# Patient Record
Sex: FEMALE | Race: BLACK OR AFRICAN AMERICAN | NOT HISPANIC OR LATINO | Employment: STUDENT | URBAN - METROPOLITAN AREA
[De-identification: names, ages, dates, MRNs, and addresses within clinical notes are randomized per-mention and may not be internally consistent; named-entity substitution may affect disease eponyms.]

---
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
## 2021-01-07 ENCOUNTER — TELEPHONE (OUTPATIENT)
Dept: OBSTETRICS AND GYNECOLOGY | Facility: CLINIC | Age: 21
End: 2021-01-07

## 2021-01-20 ENCOUNTER — PROCEDURE VISIT (OUTPATIENT)
Dept: OBSTETRICS AND GYNECOLOGY | Facility: CLINIC | Age: 21
End: 2021-01-20
Payer: MEDICAID

## 2021-01-20 ENCOUNTER — INITIAL PRENATAL (OUTPATIENT)
Dept: OBSTETRICS AND GYNECOLOGY | Facility: CLINIC | Age: 21
End: 2021-01-20
Payer: MEDICAID

## 2021-01-20 ENCOUNTER — LAB VISIT (OUTPATIENT)
Dept: LAB | Facility: HOSPITAL | Age: 21
End: 2021-01-20
Attending: MIDWIFE
Payer: MEDICAID

## 2021-01-20 VITALS
SYSTOLIC BLOOD PRESSURE: 112 MMHG | WEIGHT: 113.13 LBS | BODY MASS INDEX: 20.03 KG/M2 | DIASTOLIC BLOOD PRESSURE: 70 MMHG

## 2021-01-20 DIAGNOSIS — O26.841 UTERINE SIZE DATE DISCREPANCY PREGNANCY, FIRST TRIMESTER: ICD-10-CM

## 2021-01-20 DIAGNOSIS — Z13.79 GENETIC TESTING: ICD-10-CM

## 2021-01-20 DIAGNOSIS — Z32.01 POSITIVE PREGNANCY TEST: ICD-10-CM

## 2021-01-20 DIAGNOSIS — Z36.89 ENCOUNTER FOR FETAL ANATOMIC SURVEY: ICD-10-CM

## 2021-01-20 DIAGNOSIS — Z34.01 SUPERVISION OF NORMAL FIRST PREGNANCY IN FIRST TRIMESTER: Primary | ICD-10-CM

## 2021-01-20 PROCEDURE — 99212 OFFICE O/P EST SF 10 MIN: CPT | Mod: PBBFAC,TH,25 | Performed by: MIDWIFE

## 2021-01-20 PROCEDURE — 36415 COLL VENOUS BLD VENIPUNCTURE: CPT

## 2021-01-20 PROCEDURE — 99999 PR PBB SHADOW E&M-EST. PATIENT-LVL II: CPT | Mod: PBBFAC,,, | Performed by: MIDWIFE

## 2021-01-20 PROCEDURE — 76816 OB US FOLLOW-UP PER FETUS: CPT | Mod: 26,S$PBB,, | Performed by: OBSTETRICS & GYNECOLOGY

## 2021-01-20 PROCEDURE — 76816 OB US FOLLOW-UP PER FETUS: CPT | Mod: PBBFAC | Performed by: OBSTETRICS & GYNECOLOGY

## 2021-01-20 PROCEDURE — 99213 OFFICE O/P EST LOW 20 MIN: CPT | Mod: TH,S$PBB,25, | Performed by: MIDWIFE

## 2021-01-20 PROCEDURE — 99999 PR PBB SHADOW E&M-EST. PATIENT-LVL II: ICD-10-PCS | Mod: PBBFAC,,, | Performed by: MIDWIFE

## 2021-01-20 PROCEDURE — 81511 FTL CGEN ABNOR FOUR ANAL: CPT

## 2021-01-20 PROCEDURE — 76816 PR  US,PREGNANT UTERUS,F/U,TRANSABD APP: ICD-10-PCS | Mod: 26,S$PBB,, | Performed by: OBSTETRICS & GYNECOLOGY

## 2021-01-20 PROCEDURE — 99213 PR OFFICE/OUTPT VISIT, EST, LEVL III, 20-29 MIN: ICD-10-PCS | Mod: TH,S$PBB,25, | Performed by: MIDWIFE

## 2021-01-20 RX ORDER — CLINDAMYCIN HYDROCHLORIDE 300 MG/1
CAPSULE ORAL
COMMUNITY
Start: 2021-01-04 | End: 2021-02-22

## 2021-01-20 RX ORDER — ACETAMINOPHEN AND CODEINE PHOSPHATE 300; 30 MG/1; MG/1
1 TABLET ORAL
COMMUNITY
Start: 2021-01-06 | End: 2021-02-22

## 2021-01-20 RX ORDER — AMOXICILLIN AND CLAVULANATE POTASSIUM 875; 125 MG/1; MG/1
TABLET, FILM COATED ORAL
COMMUNITY
Start: 2021-01-18 | End: 2021-05-07

## 2021-01-20 RX ORDER — ASCORBIC ACID, CHOLECALCIFEROL, DL-.ALPHA.-TOCOPHEROL ACETATE, THIAMINE HYDROCHLORIDE, RIBOFLAVIN, NIACINAMIDE, PYRIDOXINE HYDROCHLORIDE, FOLIC ACID, CYANOCOBALAMIN, CALCIUM CARBONATE, FERROUS FUMARATE, ZINC OXIDE, CUPRIC SULFATE 100; 400; 30; 1.6; 1.6; 20; 3.1; 1; 12; 200; 27; 10; 2 MG/1; [IU]/1; [IU]/1; MG/1; MG/1; MG/1; MG/1; MG/1; UG/1; MG/1; MG/1; MG/1; MG/1
1 TABLET, COATED ORAL DAILY
Qty: 30 TABLET | Refills: 10 | Status: SHIPPED | OUTPATIENT
Start: 2021-01-20 | End: 2021-03-14 | Stop reason: SDUPTHER

## 2021-01-23 LAB
# FETUSES US: NORMAL
2ND TRIMESTER 4 SCREEN PNL SERPL: NEGATIVE
2ND TRIMESTER 4 SCREEN SERPL-IMP: NORMAL
AFP MOM SERPL: 1.3
AFP SERPL-MCNC: 70.8 NG/ML
AGE AT DELIVERY: 19
B-HCG MOM SERPL: 2.93
B-HCG SERPL-ACNC: 113.1 IU/ML
FET TS 21 RISK FROM MAT AGE: NORMAL
GA (DAYS): 2 D
GA (WEEKS): 17 WK
GA METHOD: NORMAL
IDDM PATIENT QL: NORMAL
INHIBIN A MOM SERPL: 1.39
INHIBIN A SERPL-MCNC: 218.8 PG/ML
SMOKING STATUS FTND: NO
TS 18 RISK FETUS: NORMAL
TS 21 RISK FETUS: NORMAL
U ESTRIOL MOM SERPL: 1.44
U ESTRIOL SERPL-MCNC: 2.09 NG/ML

## 2021-02-05 ENCOUNTER — PATIENT MESSAGE (OUTPATIENT)
Dept: OBSTETRICS AND GYNECOLOGY | Facility: CLINIC | Age: 21
End: 2021-02-05

## 2021-02-22 ENCOUNTER — PROCEDURE VISIT (OUTPATIENT)
Dept: OBSTETRICS AND GYNECOLOGY | Facility: CLINIC | Age: 21
End: 2021-02-22
Payer: MEDICAID

## 2021-02-22 ENCOUNTER — ROUTINE PRENATAL (OUTPATIENT)
Dept: OBSTETRICS AND GYNECOLOGY | Facility: CLINIC | Age: 21
End: 2021-02-22
Payer: MEDICAID

## 2021-02-22 VITALS
SYSTOLIC BLOOD PRESSURE: 100 MMHG | WEIGHT: 119.25 LBS | BODY MASS INDEX: 21.13 KG/M2 | DIASTOLIC BLOOD PRESSURE: 70 MMHG

## 2021-02-22 DIAGNOSIS — O09.32 LATE PRENATAL CARE COMPLICATING PREGNANCY IN SECOND TRIMESTER: ICD-10-CM

## 2021-02-22 DIAGNOSIS — R82.71 GBS BACTERIURIA: ICD-10-CM

## 2021-02-22 DIAGNOSIS — Z34.02 SUPERVISION OF NORMAL FIRST PREGNANCY IN SECOND TRIMESTER: Primary | ICD-10-CM

## 2021-02-22 DIAGNOSIS — Z36.89 ENCOUNTER FOR FETAL ANATOMIC SURVEY: ICD-10-CM

## 2021-02-22 PROBLEM — Z34.01 SUPERVISION OF NORMAL FIRST PREGNANCY IN FIRST TRIMESTER: Status: ACTIVE | Noted: 2021-02-22

## 2021-02-22 PROCEDURE — 99999 PR PBB SHADOW E&M-EST. PATIENT-LVL II: CPT | Mod: PBBFAC,,, | Performed by: MIDWIFE

## 2021-02-22 PROCEDURE — 99213 OFFICE O/P EST LOW 20 MIN: CPT | Mod: TH,S$PBB,, | Performed by: MIDWIFE

## 2021-02-22 PROCEDURE — 87077 CULTURE AEROBIC IDENTIFY: CPT

## 2021-02-22 PROCEDURE — 99213 PR OFFICE/OUTPT VISIT, EST, LEVL III, 20-29 MIN: ICD-10-PCS | Mod: TH,S$PBB,, | Performed by: MIDWIFE

## 2021-02-22 PROCEDURE — 87088 URINE BACTERIA CULTURE: CPT

## 2021-02-22 PROCEDURE — 87086 URINE CULTURE/COLONY COUNT: CPT

## 2021-02-22 PROCEDURE — 99999 PR PBB SHADOW E&M-EST. PATIENT-LVL II: ICD-10-PCS | Mod: PBBFAC,,, | Performed by: MIDWIFE

## 2021-02-22 PROCEDURE — 76805 PR US, OB 14+WKS, TRANSABD, SINGLE GESTATION: ICD-10-PCS | Mod: 26,S$PBB,, | Performed by: OBSTETRICS & GYNECOLOGY

## 2021-02-22 PROCEDURE — 76805 OB US >/= 14 WKS SNGL FETUS: CPT | Mod: 26,S$PBB,, | Performed by: OBSTETRICS & GYNECOLOGY

## 2021-02-22 PROCEDURE — 99212 OFFICE O/P EST SF 10 MIN: CPT | Mod: PBBFAC,TH,25 | Performed by: MIDWIFE

## 2021-02-22 PROCEDURE — 76805 OB US >/= 14 WKS SNGL FETUS: CPT | Mod: PBBFAC | Performed by: OBSTETRICS & GYNECOLOGY

## 2021-02-22 PROCEDURE — 87186 SC STD MICRODIL/AGAR DIL: CPT

## 2021-02-25 LAB — BACTERIA UR CULT: ABNORMAL

## 2021-03-12 ENCOUNTER — PATIENT MESSAGE (OUTPATIENT)
Dept: OBSTETRICS AND GYNECOLOGY | Facility: CLINIC | Age: 21
End: 2021-03-12

## 2021-03-14 DIAGNOSIS — Z34.01 SUPERVISION OF NORMAL FIRST PREGNANCY IN FIRST TRIMESTER: ICD-10-CM

## 2021-03-14 RX ORDER — ASCORBIC ACID, CHOLECALCIFEROL, DL-.ALPHA.-TOCOPHEROL ACETATE, THIAMINE HYDROCHLORIDE, RIBOFLAVIN, NIACINAMIDE, PYRIDOXINE HYDROCHLORIDE, FOLIC ACID, CYANOCOBALAMIN, CALCIUM CARBONATE, FERROUS FUMARATE, ZINC OXIDE, CUPRIC SULFATE 100; 400; 30; 1.6; 1.6; 20; 3.1; 1; 12; 200; 27; 10; 2 MG/1; [IU]/1; [IU]/1; MG/1; MG/1; MG/1; MG/1; MG/1; UG/1; MG/1; MG/1; MG/1; MG/1
1 TABLET, COATED ORAL DAILY
Qty: 30 TABLET | Refills: 10 | Status: SHIPPED | OUTPATIENT
Start: 2021-03-14 | End: 2021-03-30 | Stop reason: SDUPTHER

## 2021-03-25 ENCOUNTER — PATIENT MESSAGE (OUTPATIENT)
Dept: OBSTETRICS AND GYNECOLOGY | Facility: CLINIC | Age: 21
End: 2021-03-25

## 2021-03-26 ENCOUNTER — PATIENT MESSAGE (OUTPATIENT)
Dept: OBSTETRICS AND GYNECOLOGY | Facility: CLINIC | Age: 21
End: 2021-03-26

## 2021-03-30 ENCOUNTER — PROCEDURE VISIT (OUTPATIENT)
Dept: OBSTETRICS AND GYNECOLOGY | Facility: CLINIC | Age: 21
End: 2021-03-30
Payer: MEDICAID

## 2021-03-30 ENCOUNTER — ROUTINE PRENATAL (OUTPATIENT)
Dept: OBSTETRICS AND GYNECOLOGY | Facility: CLINIC | Age: 21
End: 2021-03-30
Payer: MEDICAID

## 2021-03-30 VITALS
WEIGHT: 126.31 LBS | SYSTOLIC BLOOD PRESSURE: 102 MMHG | BODY MASS INDEX: 22.38 KG/M2 | DIASTOLIC BLOOD PRESSURE: 60 MMHG

## 2021-03-30 DIAGNOSIS — Z34.02 NORMAL FIRST PREGNANCY IN SECOND TRIMESTER: Primary | ICD-10-CM

## 2021-03-30 DIAGNOSIS — Z3A.27 27 WEEKS GESTATION OF PREGNANCY: ICD-10-CM

## 2021-03-30 PROCEDURE — 99212 OFFICE O/P EST SF 10 MIN: CPT | Mod: PBBFAC,TH,25 | Performed by: MIDWIFE

## 2021-03-30 PROCEDURE — 99999 PR PBB SHADOW E&M-EST. PATIENT-LVL II: ICD-10-PCS | Mod: PBBFAC,,, | Performed by: MIDWIFE

## 2021-03-30 PROCEDURE — 76816 OB US FOLLOW-UP PER FETUS: CPT | Mod: PBBFAC | Performed by: OBSTETRICS & GYNECOLOGY

## 2021-03-30 PROCEDURE — 76816 OB US FOLLOW-UP PER FETUS: CPT | Mod: 26,S$PBB,, | Performed by: OBSTETRICS & GYNECOLOGY

## 2021-03-30 PROCEDURE — 99213 PR OFFICE/OUTPT VISIT, EST, LEVL III, 20-29 MIN: ICD-10-PCS | Mod: TH,S$PBB,, | Performed by: MIDWIFE

## 2021-03-30 PROCEDURE — 99999 PR PBB SHADOW E&M-EST. PATIENT-LVL II: CPT | Mod: PBBFAC,,, | Performed by: MIDWIFE

## 2021-03-30 PROCEDURE — 99213 OFFICE O/P EST LOW 20 MIN: CPT | Mod: TH,S$PBB,, | Performed by: MIDWIFE

## 2021-03-30 PROCEDURE — 76816 PR  US,PREGNANT UTERUS,F/U,TRANSABD APP: ICD-10-PCS | Mod: 26,S$PBB,, | Performed by: OBSTETRICS & GYNECOLOGY

## 2021-03-30 RX ORDER — CYCLOBENZAPRINE HCL 10 MG
10 TABLET ORAL 3 TIMES DAILY PRN
Qty: 30 TABLET | Refills: 0 | Status: SHIPPED | OUTPATIENT
Start: 2021-03-30 | End: 2021-04-09

## 2021-03-30 RX ORDER — ASCORBIC ACID, CHOLECALCIFEROL, DL-.ALPHA.-TOCOPHEROL ACETATE, THIAMINE HYDROCHLORIDE, RIBOFLAVIN, NIACINAMIDE, PYRIDOXINE HYDROCHLORIDE, FOLIC ACID, CYANOCOBALAMIN, CALCIUM CARBONATE, FERROUS FUMARATE, ZINC OXIDE, CUPRIC SULFATE 100; 400; 30; 1.6; 1.6; 20; 3.1; 1; 12; 200; 27; 10; 2 MG/1; [IU]/1; [IU]/1; MG/1; MG/1; MG/1; MG/1; MG/1; UG/1; MG/1; MG/1; MG/1; MG/1
1 TABLET, COATED ORAL
COMMUNITY
Start: 2021-01-20 | End: 2022-08-12 | Stop reason: ALTCHOICE

## 2021-04-05 ENCOUNTER — DOCUMENTATION ONLY (OUTPATIENT)
Dept: REHABILITATION | Facility: HOSPITAL | Age: 21
End: 2021-04-05

## 2021-04-21 ENCOUNTER — LAB VISIT (OUTPATIENT)
Dept: LAB | Facility: HOSPITAL | Age: 21
End: 2021-04-21
Attending: MIDWIFE
Payer: MEDICAID

## 2021-04-21 ENCOUNTER — ROUTINE PRENATAL (OUTPATIENT)
Dept: OBSTETRICS AND GYNECOLOGY | Facility: CLINIC | Age: 21
End: 2021-04-21
Payer: MEDICAID

## 2021-04-21 ENCOUNTER — TELEPHONE (OUTPATIENT)
Dept: OBSTETRICS AND GYNECOLOGY | Facility: CLINIC | Age: 21
End: 2021-04-21

## 2021-04-21 ENCOUNTER — PROCEDURE VISIT (OUTPATIENT)
Dept: OBSTETRICS AND GYNECOLOGY | Facility: CLINIC | Age: 21
End: 2021-04-21
Payer: MEDICAID

## 2021-04-21 VITALS
WEIGHT: 127.63 LBS | BODY MASS INDEX: 22.61 KG/M2 | SYSTOLIC BLOOD PRESSURE: 106 MMHG | DIASTOLIC BLOOD PRESSURE: 62 MMHG

## 2021-04-21 DIAGNOSIS — Z3A.27 27 WEEKS GESTATION OF PREGNANCY: ICD-10-CM

## 2021-04-21 DIAGNOSIS — Z34.03 NORMAL FIRST PREGNANCY IN THIRD TRIMESTER: Primary | ICD-10-CM

## 2021-04-21 LAB
BASOPHILS # BLD AUTO: 0.03 K/UL (ref 0–0.2)
BASOPHILS NFR BLD: 0.3 % (ref 0–1.9)
DIFFERENTIAL METHOD: ABNORMAL
EOSINOPHIL # BLD AUTO: 0.1 K/UL (ref 0–0.5)
EOSINOPHIL NFR BLD: 0.6 % (ref 0–8)
ERYTHROCYTE [DISTWIDTH] IN BLOOD BY AUTOMATED COUNT: 12.5 % (ref 11.5–14.5)
GIANT PLATELETS BLD QL SMEAR: PRESENT
HCT VFR BLD AUTO: 32.7 % (ref 37–48.5)
HGB BLD-MCNC: 10.9 G/DL (ref 12–16)
IMM GRANULOCYTES # BLD AUTO: 0.14 K/UL (ref 0–0.04)
IMM GRANULOCYTES NFR BLD AUTO: 1.4 % (ref 0–0.5)
LYMPHOCYTES # BLD AUTO: 1.4 K/UL (ref 1–4.8)
LYMPHOCYTES NFR BLD: 13.9 % (ref 18–48)
MCH RBC QN AUTO: 32.4 PG (ref 27–31)
MCHC RBC AUTO-ENTMCNC: 33.3 G/DL (ref 32–36)
MCV RBC AUTO: 97 FL (ref 82–98)
MONOCYTES # BLD AUTO: 0.9 K/UL (ref 0.3–1)
MONOCYTES NFR BLD: 9.5 % (ref 4–15)
NEUTROPHILS # BLD AUTO: 7.2 K/UL (ref 1.8–7.7)
NEUTROPHILS NFR BLD: 74.3 % (ref 38–73)
NRBC BLD-RTO: 0 /100 WBC
PLATELET # BLD AUTO: 196 K/UL (ref 150–450)
PLATELET BLD QL SMEAR: ABNORMAL
PMV BLD AUTO: 11.3 FL (ref 9.2–12.9)
POLYCHROMASIA BLD QL SMEAR: ABNORMAL
RBC # BLD AUTO: 3.36 M/UL (ref 4–5.4)
WBC # BLD AUTO: 9.74 K/UL (ref 3.9–12.7)
WBC TOXIC VACUOLES BLD QL SMEAR: PRESENT

## 2021-04-21 PROCEDURE — 99213 PR OFFICE/OUTPT VISIT, EST, LEVL III, 20-29 MIN: ICD-10-PCS | Mod: TH,S$PBB,, | Performed by: MIDWIFE

## 2021-04-21 PROCEDURE — 76816 OB US FOLLOW-UP PER FETUS: CPT | Mod: 51,PBBFAC | Performed by: OBSTETRICS & GYNECOLOGY

## 2021-04-21 PROCEDURE — 76816 OB US FOLLOW-UP PER FETUS: CPT | Mod: 26,S$PBB,, | Performed by: OBSTETRICS & GYNECOLOGY

## 2021-04-21 PROCEDURE — 82950 GLUCOSE TEST: CPT | Performed by: MIDWIFE

## 2021-04-21 PROCEDURE — 76819 FETAL BIOPHYS PROFIL W/O NST: CPT | Mod: 26,S$PBB,, | Performed by: OBSTETRICS & GYNECOLOGY

## 2021-04-21 PROCEDURE — 85025 COMPLETE CBC W/AUTO DIFF WBC: CPT | Performed by: MIDWIFE

## 2021-04-21 PROCEDURE — 36415 COLL VENOUS BLD VENIPUNCTURE: CPT | Performed by: MIDWIFE

## 2021-04-21 PROCEDURE — 86703 HIV-1/HIV-2 1 RESULT ANTBDY: CPT | Performed by: MIDWIFE

## 2021-04-21 PROCEDURE — 76816 PR  US,PREGNANT UTERUS,F/U,TRANSABD APP: ICD-10-PCS | Mod: 26,S$PBB,, | Performed by: OBSTETRICS & GYNECOLOGY

## 2021-04-21 PROCEDURE — 76819 PR US, OB, FETAL BIOPHYSICAL, W/O NST: ICD-10-PCS | Mod: 26,S$PBB,, | Performed by: OBSTETRICS & GYNECOLOGY

## 2021-04-21 PROCEDURE — 99212 OFFICE O/P EST SF 10 MIN: CPT | Mod: PBBFAC,TH,25 | Performed by: MIDWIFE

## 2021-04-21 PROCEDURE — 76819 FETAL BIOPHYS PROFIL W/O NST: CPT | Mod: 51,PBBFAC | Performed by: OBSTETRICS & GYNECOLOGY

## 2021-04-21 PROCEDURE — 86592 SYPHILIS TEST NON-TREP QUAL: CPT | Performed by: MIDWIFE

## 2021-04-21 PROCEDURE — 99999 PR PBB SHADOW E&M-EST. PATIENT-LVL II: CPT | Mod: PBBFAC,,, | Performed by: MIDWIFE

## 2021-04-21 PROCEDURE — 99213 OFFICE O/P EST LOW 20 MIN: CPT | Mod: TH,S$PBB,, | Performed by: MIDWIFE

## 2021-04-21 PROCEDURE — 99999 PR PBB SHADOW E&M-EST. PATIENT-LVL II: ICD-10-PCS | Mod: PBBFAC,,, | Performed by: MIDWIFE

## 2021-04-22 LAB
GLUCOSE SERPL-MCNC: 100 MG/DL (ref 70–140)
HIV 1+2 AB+HIV1 P24 AG SERPL QL IA: NEGATIVE
RPR SER QL: NORMAL

## 2021-04-29 ENCOUNTER — PATIENT MESSAGE (OUTPATIENT)
Dept: RESEARCH | Facility: HOSPITAL | Age: 21
End: 2021-04-29

## 2021-05-03 ENCOUNTER — PATIENT MESSAGE (OUTPATIENT)
Dept: ADMINISTRATIVE | Facility: OTHER | Age: 21
End: 2021-05-03

## 2021-05-07 ENCOUNTER — ROUTINE PRENATAL (OUTPATIENT)
Dept: OBSTETRICS AND GYNECOLOGY | Facility: CLINIC | Age: 21
End: 2021-05-07
Payer: MEDICAID

## 2021-05-07 VITALS — BODY MASS INDEX: 23.71 KG/M2 | WEIGHT: 133.81 LBS | DIASTOLIC BLOOD PRESSURE: 60 MMHG | SYSTOLIC BLOOD PRESSURE: 96 MMHG

## 2021-05-07 DIAGNOSIS — Z34.93 NORMAL PREGNANCY IN THIRD TRIMESTER: ICD-10-CM

## 2021-05-07 DIAGNOSIS — Z36.89 DETERMINE FETAL PRESENTATION USING ULTRASOUND: Primary | ICD-10-CM

## 2021-05-07 PROCEDURE — 99999 PR PBB SHADOW E&M-EST. PATIENT-LVL II: ICD-10-PCS | Mod: PBBFAC,,, | Performed by: MIDWIFE

## 2021-05-07 PROCEDURE — 99999 PR PBB SHADOW E&M-EST. PATIENT-LVL II: CPT | Mod: PBBFAC,,, | Performed by: MIDWIFE

## 2021-05-07 PROCEDURE — 99213 PR OFFICE/OUTPT VISIT, EST, LEVL III, 20-29 MIN: ICD-10-PCS | Mod: TH,S$PBB,, | Performed by: MIDWIFE

## 2021-05-07 PROCEDURE — 99212 OFFICE O/P EST SF 10 MIN: CPT | Mod: PBBFAC,TH | Performed by: MIDWIFE

## 2021-05-07 PROCEDURE — 99213 OFFICE O/P EST LOW 20 MIN: CPT | Mod: TH,S$PBB,, | Performed by: MIDWIFE

## 2021-05-07 RX ORDER — CYCLOBENZAPRINE HCL 10 MG
10 TABLET ORAL 3 TIMES DAILY PRN
COMMUNITY
Start: 2021-04-15 | End: 2021-05-07

## 2021-05-24 ENCOUNTER — ROUTINE PRENATAL (OUTPATIENT)
Dept: OBSTETRICS AND GYNECOLOGY | Facility: CLINIC | Age: 21
End: 2021-05-24
Payer: MEDICAID

## 2021-05-24 VITALS
WEIGHT: 135.81 LBS | SYSTOLIC BLOOD PRESSURE: 110 MMHG | DIASTOLIC BLOOD PRESSURE: 68 MMHG | BODY MASS INDEX: 24.06 KG/M2

## 2021-05-24 DIAGNOSIS — Z23 NEED FOR TDAP VACCINATION: ICD-10-CM

## 2021-05-24 DIAGNOSIS — Z34.03 NORMAL FIRST PREGNANCY IN THIRD TRIMESTER: Primary | ICD-10-CM

## 2021-05-24 DIAGNOSIS — Z3A.35 35 WEEKS GESTATION OF PREGNANCY: ICD-10-CM

## 2021-05-24 PROCEDURE — 99212 OFFICE O/P EST SF 10 MIN: CPT | Mod: PBBFAC,TH,25 | Performed by: MIDWIFE

## 2021-05-24 PROCEDURE — 99999 PR PBB SHADOW E&M-EST. PATIENT-LVL II: ICD-10-PCS | Mod: PBBFAC,,, | Performed by: MIDWIFE

## 2021-05-24 PROCEDURE — 99999 PR PBB SHADOW E&M-EST. PATIENT-LVL II: CPT | Mod: PBBFAC,,, | Performed by: MIDWIFE

## 2021-05-24 PROCEDURE — 90471 IMMUNIZATION ADMIN: CPT | Mod: PBBFAC

## 2021-05-24 PROCEDURE — 99213 OFFICE O/P EST LOW 20 MIN: CPT | Mod: TH,S$PBB,, | Performed by: MIDWIFE

## 2021-05-24 PROCEDURE — 99213 PR OFFICE/OUTPT VISIT, EST, LEVL III, 20-29 MIN: ICD-10-PCS | Mod: TH,S$PBB,, | Performed by: MIDWIFE

## 2021-05-31 ENCOUNTER — LAB VISIT (OUTPATIENT)
Dept: LAB | Facility: HOSPITAL | Age: 21
End: 2021-05-31
Attending: MIDWIFE
Payer: MEDICAID

## 2021-05-31 ENCOUNTER — ROUTINE PRENATAL (OUTPATIENT)
Dept: OBSTETRICS AND GYNECOLOGY | Facility: CLINIC | Age: 21
End: 2021-05-31
Payer: MEDICAID

## 2021-05-31 VITALS
SYSTOLIC BLOOD PRESSURE: 114 MMHG | WEIGHT: 139.75 LBS | BODY MASS INDEX: 24.76 KG/M2 | DIASTOLIC BLOOD PRESSURE: 68 MMHG

## 2021-05-31 DIAGNOSIS — Z3A.36 36 WEEKS GESTATION OF PREGNANCY: ICD-10-CM

## 2021-05-31 DIAGNOSIS — L29.9 PRURITUS: ICD-10-CM

## 2021-05-31 DIAGNOSIS — Z34.03 NORMAL FIRST PREGNANCY IN THIRD TRIMESTER: Primary | ICD-10-CM

## 2021-05-31 PROCEDURE — 99213 PR OFFICE/OUTPT VISIT, EST, LEVL III, 20-29 MIN: ICD-10-PCS | Mod: TH,S$PBB,, | Performed by: MIDWIFE

## 2021-05-31 PROCEDURE — 99999 PR PBB SHADOW E&M-EST. PATIENT-LVL II: ICD-10-PCS | Mod: PBBFAC,,, | Performed by: MIDWIFE

## 2021-05-31 PROCEDURE — 99999 PR PBB SHADOW E&M-EST. PATIENT-LVL II: CPT | Mod: PBBFAC,,, | Performed by: MIDWIFE

## 2021-05-31 PROCEDURE — 99212 OFFICE O/P EST SF 10 MIN: CPT | Mod: PBBFAC,TH | Performed by: MIDWIFE

## 2021-05-31 PROCEDURE — 82239 BILE ACIDS TOTAL: CPT | Performed by: MIDWIFE

## 2021-05-31 PROCEDURE — 99213 OFFICE O/P EST LOW 20 MIN: CPT | Mod: TH,S$PBB,, | Performed by: MIDWIFE

## 2021-05-31 PROCEDURE — 36415 COLL VENOUS BLD VENIPUNCTURE: CPT | Performed by: MIDWIFE

## 2021-06-02 LAB — BILE AC SERPL-SCNC: 2 MCMOL/L

## 2021-06-07 ENCOUNTER — HOSPITAL ENCOUNTER (OUTPATIENT)
Facility: HOSPITAL | Age: 21
Discharge: HOME | End: 2021-06-07
Attending: OBSTETRICS & GYNECOLOGY | Admitting: OBSTETRICS & GYNECOLOGY
Payer: MEDICAID

## 2021-06-07 ENCOUNTER — NURSE TRIAGE (OUTPATIENT)
Dept: ADMINISTRATIVE | Facility: CLINIC | Age: 21
End: 2021-06-07

## 2021-06-07 VITALS
OXYGEN SATURATION: 100 % | SYSTOLIC BLOOD PRESSURE: 94 MMHG | RESPIRATION RATE: 20 BRPM | DIASTOLIC BLOOD PRESSURE: 64 MMHG | TEMPERATURE: 99 F | HEART RATE: 100 BPM

## 2021-06-07 DIAGNOSIS — R55 VASOVAGAL SYNCOPE: ICD-10-CM

## 2021-06-07 PROCEDURE — 59025 OBTAIN FETAL NONSTRESS TEST (NST): ICD-10-PCS | Mod: 26,S$PBB,, | Performed by: MIDWIFE

## 2021-06-07 PROCEDURE — 59025 FETAL NON-STRESS TEST: CPT

## 2021-06-07 PROCEDURE — 59025 FETAL NON-STRESS TEST: CPT | Mod: 26,S$PBB,, | Performed by: MIDWIFE

## 2021-06-07 PROCEDURE — 99213 OFFICE O/P EST LOW 20 MIN: CPT | Mod: 25,TH,S$PBB, | Performed by: MIDWIFE

## 2021-06-07 PROCEDURE — 99213 PR OFFICE/OUTPT VISIT, EST, LEVL III, 20-29 MIN: ICD-10-PCS | Mod: 25,TH,S$PBB, | Performed by: MIDWIFE

## 2021-06-07 PROCEDURE — 99211 OFF/OP EST MAY X REQ PHY/QHP: CPT | Mod: 25

## 2021-06-07 RX ORDER — ACETAMINOPHEN 500 MG
500 TABLET ORAL EVERY 6 HOURS PRN
Status: DISCONTINUED | OUTPATIENT
Start: 2021-06-07 | End: 2021-06-07 | Stop reason: HOSPADM

## 2021-06-07 RX ORDER — SODIUM CHLORIDE, SODIUM LACTATE, POTASSIUM CHLORIDE, CALCIUM CHLORIDE 600; 310; 30; 20 MG/100ML; MG/100ML; MG/100ML; MG/100ML
INJECTION, SOLUTION INTRAVENOUS CONTINUOUS
Status: DISCONTINUED | OUTPATIENT
Start: 2021-06-07 | End: 2021-06-07 | Stop reason: HOSPADM

## 2021-06-07 RX ORDER — PROCHLORPERAZINE EDISYLATE 5 MG/ML
5 INJECTION INTRAMUSCULAR; INTRAVENOUS EVERY 6 HOURS PRN
Status: DISCONTINUED | OUTPATIENT
Start: 2021-06-07 | End: 2021-06-07 | Stop reason: HOSPADM

## 2021-06-07 RX ORDER — ONDANSETRON 8 MG/1
8 TABLET, ORALLY DISINTEGRATING ORAL EVERY 8 HOURS PRN
Status: DISCONTINUED | OUTPATIENT
Start: 2021-06-07 | End: 2021-06-07 | Stop reason: HOSPADM

## 2021-06-08 ENCOUNTER — PROCEDURE VISIT (OUTPATIENT)
Dept: OBSTETRICS AND GYNECOLOGY | Facility: CLINIC | Age: 21
End: 2021-06-08
Payer: MEDICAID

## 2021-06-08 ENCOUNTER — ROUTINE PRENATAL (OUTPATIENT)
Dept: OBSTETRICS AND GYNECOLOGY | Facility: CLINIC | Age: 21
End: 2021-06-08
Payer: MEDICAID

## 2021-06-08 VITALS
BODY MASS INDEX: 24.68 KG/M2 | WEIGHT: 139.31 LBS | SYSTOLIC BLOOD PRESSURE: 102 MMHG | DIASTOLIC BLOOD PRESSURE: 60 MMHG

## 2021-06-08 DIAGNOSIS — O09.33 LATE PRENATAL CARE AFFECTING PREGNANCY IN THIRD TRIMESTER: ICD-10-CM

## 2021-06-08 DIAGNOSIS — Z36.89 DETERMINE FETAL PRESENTATION USING ULTRASOUND: ICD-10-CM

## 2021-06-08 DIAGNOSIS — Z34.03 NORMAL FIRST PREGNANCY IN THIRD TRIMESTER: Primary | ICD-10-CM

## 2021-06-08 DIAGNOSIS — Z3A.37 37 WEEKS GESTATION OF PREGNANCY: ICD-10-CM

## 2021-06-08 DIAGNOSIS — O26.843 UTERINE SIZE-DATE DISCREPANCY IN THIRD TRIMESTER: ICD-10-CM

## 2021-06-08 PROCEDURE — 99212 OFFICE O/P EST SF 10 MIN: CPT | Mod: PBBFAC,TH | Performed by: MIDWIFE

## 2021-06-08 PROCEDURE — 99999 PR PBB SHADOW E&M-EST. PATIENT-LVL II: ICD-10-PCS | Mod: PBBFAC,,, | Performed by: MIDWIFE

## 2021-06-08 PROCEDURE — 99213 PR OFFICE/OUTPT VISIT, EST, LEVL III, 20-29 MIN: ICD-10-PCS | Mod: TH,S$PBB,, | Performed by: MIDWIFE

## 2021-06-08 PROCEDURE — 99213 OFFICE O/P EST LOW 20 MIN: CPT | Mod: TH,S$PBB,, | Performed by: MIDWIFE

## 2021-06-08 PROCEDURE — 76816 OB US FOLLOW-UP PER FETUS: CPT | Mod: PBBFAC | Performed by: OBSTETRICS & GYNECOLOGY

## 2021-06-08 PROCEDURE — 76818 FETAL BIOPHYS PROFILE W/NST: CPT | Mod: PBBFAC | Performed by: OBSTETRICS & GYNECOLOGY

## 2021-06-08 PROCEDURE — 76818 FETAL BIOPHYS PROFILE W/NST: CPT | Mod: 26,S$PBB,, | Performed by: OBSTETRICS & GYNECOLOGY

## 2021-06-08 PROCEDURE — 76816 PR  US,PREGNANT UTERUS,F/U,TRANSABD APP: ICD-10-PCS | Mod: 26,S$PBB,, | Performed by: OBSTETRICS & GYNECOLOGY

## 2021-06-08 PROCEDURE — 76818 PR US, OB, FETAL BIOPHYSICAL, W/NST: ICD-10-PCS | Mod: 26,S$PBB,, | Performed by: OBSTETRICS & GYNECOLOGY

## 2021-06-08 PROCEDURE — 76816 OB US FOLLOW-UP PER FETUS: CPT | Mod: 26,S$PBB,, | Performed by: OBSTETRICS & GYNECOLOGY

## 2021-06-08 PROCEDURE — 99999 PR PBB SHADOW E&M-EST. PATIENT-LVL II: CPT | Mod: PBBFAC,,, | Performed by: MIDWIFE

## 2021-06-09 ENCOUNTER — HOSPITAL ENCOUNTER (OUTPATIENT)
Facility: HOSPITAL | Age: 21
Discharge: HOME | End: 2021-06-09
Attending: OBSTETRICS & GYNECOLOGY | Admitting: OBSTETRICS & GYNECOLOGY
Payer: MEDICAID

## 2021-06-09 VITALS — RESPIRATION RATE: 18 BRPM | TEMPERATURE: 98 F

## 2021-06-09 DIAGNOSIS — O47.9 IRREGULAR CONTRACTIONS: ICD-10-CM

## 2021-06-09 PROCEDURE — 99213 OFFICE O/P EST LOW 20 MIN: CPT | Mod: TH,25,S$PBB, | Performed by: MIDWIFE

## 2021-06-09 PROCEDURE — 99211 OFF/OP EST MAY X REQ PHY/QHP: CPT | Mod: TH,25

## 2021-06-09 PROCEDURE — 59025 FETAL NON-STRESS TEST: CPT | Mod: 26,S$PBB,, | Performed by: MIDWIFE

## 2021-06-09 PROCEDURE — 59025 OBTAIN FETAL NONSTRESS TEST (NST): ICD-10-PCS | Mod: 26,S$PBB,, | Performed by: MIDWIFE

## 2021-06-09 PROCEDURE — 59025 FETAL NON-STRESS TEST: CPT

## 2021-06-09 PROCEDURE — 99213 PR OFFICE/OUTPT VISIT, EST, LEVL III, 20-29 MIN: ICD-10-PCS | Mod: TH,25,S$PBB, | Performed by: MIDWIFE

## 2021-06-18 ENCOUNTER — ROUTINE PRENATAL (OUTPATIENT)
Dept: OBSTETRICS AND GYNECOLOGY | Facility: CLINIC | Age: 21
End: 2021-06-18
Payer: MEDICAID

## 2021-06-18 VITALS — WEIGHT: 142.19 LBS | BODY MASS INDEX: 25.19 KG/M2 | SYSTOLIC BLOOD PRESSURE: 98 MMHG | DIASTOLIC BLOOD PRESSURE: 64 MMHG

## 2021-06-18 DIAGNOSIS — Z3A.38 38 WEEKS GESTATION OF PREGNANCY: ICD-10-CM

## 2021-06-18 DIAGNOSIS — Z34.03 NORMAL FIRST PREGNANCY IN THIRD TRIMESTER: Primary | ICD-10-CM

## 2021-06-18 PROBLEM — O47.9 IRREGULAR CONTRACTIONS: Status: RESOLVED | Noted: 2021-06-09 | Resolved: 2021-06-18

## 2021-06-18 PROCEDURE — 99213 PR OFFICE/OUTPT VISIT, EST, LEVL III, 20-29 MIN: ICD-10-PCS | Mod: TH,S$PBB,, | Performed by: MIDWIFE

## 2021-06-18 PROCEDURE — 99212 OFFICE O/P EST SF 10 MIN: CPT | Mod: PBBFAC,TH | Performed by: MIDWIFE

## 2021-06-18 PROCEDURE — 99213 OFFICE O/P EST LOW 20 MIN: CPT | Mod: TH,S$PBB,, | Performed by: MIDWIFE

## 2021-06-18 PROCEDURE — 99999 PR PBB SHADOW E&M-EST. PATIENT-LVL II: ICD-10-PCS | Mod: PBBFAC,,, | Performed by: MIDWIFE

## 2021-06-18 PROCEDURE — 99999 PR PBB SHADOW E&M-EST. PATIENT-LVL II: CPT | Mod: PBBFAC,,, | Performed by: MIDWIFE

## 2021-06-23 ENCOUNTER — ROUTINE PRENATAL (OUTPATIENT)
Dept: OBSTETRICS AND GYNECOLOGY | Facility: CLINIC | Age: 21
End: 2021-06-23
Payer: MEDICAID

## 2021-06-23 VITALS
DIASTOLIC BLOOD PRESSURE: 60 MMHG | WEIGHT: 140.88 LBS | SYSTOLIC BLOOD PRESSURE: 100 MMHG | BODY MASS INDEX: 24.95 KG/M2

## 2021-06-23 DIAGNOSIS — Z34.03 NORMAL FIRST PREGNANCY IN THIRD TRIMESTER: Primary | ICD-10-CM

## 2021-06-23 DIAGNOSIS — Z3A.39 39 WEEKS GESTATION OF PREGNANCY: ICD-10-CM

## 2021-06-23 PROCEDURE — 99213 PR OFFICE/OUTPT VISIT, EST, LEVL III, 20-29 MIN: ICD-10-PCS | Mod: TH,S$PBB,, | Performed by: MIDWIFE

## 2021-06-23 PROCEDURE — 99999 PR PBB SHADOW E&M-EST. PATIENT-LVL II: ICD-10-PCS | Mod: PBBFAC,,, | Performed by: MIDWIFE

## 2021-06-23 PROCEDURE — 99212 OFFICE O/P EST SF 10 MIN: CPT | Mod: PBBFAC,TH | Performed by: MIDWIFE

## 2021-06-23 PROCEDURE — 99213 OFFICE O/P EST LOW 20 MIN: CPT | Mod: TH,S$PBB,, | Performed by: MIDWIFE

## 2021-06-23 PROCEDURE — 99999 PR PBB SHADOW E&M-EST. PATIENT-LVL II: CPT | Mod: PBBFAC,,, | Performed by: MIDWIFE

## 2021-06-25 ENCOUNTER — TELEPHONE (OUTPATIENT)
Dept: OBSTETRICS AND GYNECOLOGY | Facility: HOSPITAL | Age: 21
End: 2021-06-25

## 2021-06-26 ENCOUNTER — HOSPITAL ENCOUNTER (INPATIENT)
Facility: HOSPITAL | Age: 21
LOS: 2 days | Discharge: HOME | End: 2021-06-28
Attending: OBSTETRICS & GYNECOLOGY | Admitting: OBSTETRICS & GYNECOLOGY
Payer: MEDICAID

## 2021-06-26 PROBLEM — O47.9 IRREGULAR UTERINE CONTRACTIONS: Status: ACTIVE | Noted: 2021-06-26

## 2021-06-26 PROBLEM — O47.9 IRREGULAR UTERINE CONTRACTIONS: Status: RESOLVED | Noted: 2021-06-26 | Resolved: 2021-06-26

## 2021-06-26 PROBLEM — O09.32 LATE PRENATAL CARE COMPLICATING PREGNANCY IN SECOND TRIMESTER: Status: RESOLVED | Noted: 2021-02-22 | Resolved: 2021-06-26

## 2021-06-26 PROBLEM — Z34.03 NORMAL FIRST PREGNANCY IN THIRD TRIMESTER: Status: RESOLVED | Noted: 2021-02-22 | Resolved: 2021-06-26

## 2021-06-26 PROCEDURE — 11000001 HC ACUTE MED/SURG PRIVATE ROOM

## 2021-06-26 PROCEDURE — 72200004 HC VAGINAL DELIVERY LEVEL I

## 2021-06-26 PROCEDURE — 25000003 PHARM REV CODE 250: Performed by: MIDWIFE

## 2021-06-26 RX ORDER — ONDANSETRON 8 MG/1
8 TABLET, ORALLY DISINTEGRATING ORAL EVERY 8 HOURS PRN
Status: DISCONTINUED | OUTPATIENT
Start: 2021-06-26 | End: 2021-06-28 | Stop reason: HOSPADM

## 2021-06-26 RX ORDER — PROCHLORPERAZINE EDISYLATE 5 MG/ML
5 INJECTION INTRAMUSCULAR; INTRAVENOUS EVERY 6 HOURS PRN
Status: DISCONTINUED | OUTPATIENT
Start: 2021-06-26 | End: 2021-06-28 | Stop reason: HOSPADM

## 2021-06-26 RX ORDER — DIPHENHYDRAMINE HYDROCHLORIDE 50 MG/ML
25 INJECTION INTRAMUSCULAR; INTRAVENOUS EVERY 4 HOURS PRN
Status: DISCONTINUED | OUTPATIENT
Start: 2021-06-26 | End: 2021-06-28 | Stop reason: HOSPADM

## 2021-06-26 RX ORDER — DOCUSATE SODIUM 100 MG/1
200 CAPSULE, LIQUID FILLED ORAL 2 TIMES DAILY PRN
Status: DISCONTINUED | OUTPATIENT
Start: 2021-06-26 | End: 2021-06-28 | Stop reason: HOSPADM

## 2021-06-26 RX ORDER — DIPHENHYDRAMINE HCL 25 MG
25 CAPSULE ORAL EVERY 4 HOURS PRN
Status: DISCONTINUED | OUTPATIENT
Start: 2021-06-26 | End: 2021-06-28 | Stop reason: HOSPADM

## 2021-06-26 RX ORDER — HYDROCORTISONE 25 MG/G
CREAM TOPICAL 3 TIMES DAILY PRN
Status: DISCONTINUED | OUTPATIENT
Start: 2021-06-26 | End: 2021-06-28 | Stop reason: HOSPADM

## 2021-06-26 RX ORDER — IBUPROFEN 600 MG/1
600 TABLET ORAL EVERY 6 HOURS
Status: DISCONTINUED | OUTPATIENT
Start: 2021-06-26 | End: 2021-06-28 | Stop reason: HOSPADM

## 2021-06-26 RX ORDER — SIMETHICONE 80 MG
1 TABLET,CHEWABLE ORAL EVERY 6 HOURS PRN
Status: DISCONTINUED | OUTPATIENT
Start: 2021-06-26 | End: 2021-06-28 | Stop reason: HOSPADM

## 2021-06-26 RX ORDER — OXYTOCIN/RINGER'S LACTATE 30/500 ML
95 PLASTIC BAG, INJECTION (ML) INTRAVENOUS ONCE
Status: DISCONTINUED | OUTPATIENT
Start: 2021-06-26 | End: 2021-06-28 | Stop reason: HOSPADM

## 2021-06-26 RX ORDER — ACETAMINOPHEN 325 MG/1
650 TABLET ORAL EVERY 6 HOURS PRN
Status: DISCONTINUED | OUTPATIENT
Start: 2021-06-26 | End: 2021-06-28 | Stop reason: HOSPADM

## 2021-06-26 RX ORDER — PRENATAL WITH FERROUS FUM AND FOLIC ACID 3080; 920; 120; 400; 22; 1.84; 3; 20; 10; 1; 12; 200; 27; 25; 2 [IU]/1; [IU]/1; MG/1; [IU]/1; MG/1; MG/1; MG/1; MG/1; MG/1; MG/1; UG/1; MG/1; MG/1; MG/1; MG/1
1 TABLET ORAL DAILY
Status: DISCONTINUED | OUTPATIENT
Start: 2021-06-26 | End: 2021-06-28 | Stop reason: HOSPADM

## 2021-06-26 RX ORDER — HYDROCODONE BITARTRATE AND ACETAMINOPHEN 5; 325 MG/1; MG/1
1 TABLET ORAL EVERY 4 HOURS PRN
Status: DISCONTINUED | OUTPATIENT
Start: 2021-06-26 | End: 2021-06-28 | Stop reason: HOSPADM

## 2021-06-26 RX ADMIN — PRENATAL VIT W/ FE FUMARATE-FA TAB 27-0.8 MG 1 TABLET: 27-0.8 TAB at 12:06

## 2021-06-26 RX ADMIN — IBUPROFEN 600 MG: 600 TABLET, FILM COATED ORAL at 12:06

## 2021-06-26 RX ADMIN — IBUPROFEN 600 MG: 600 TABLET, FILM COATED ORAL at 05:06

## 2021-06-27 PROCEDURE — 99231 SBSQ HOSP IP/OBS SF/LOW 25: CPT | Mod: 95,,, | Performed by: MIDWIFE

## 2021-06-27 PROCEDURE — 25000003 PHARM REV CODE 250: Performed by: MIDWIFE

## 2021-06-27 PROCEDURE — 99231 PR SUBSEQUENT HOSPITAL CARE,LEVL I: ICD-10-PCS | Mod: 95,,, | Performed by: MIDWIFE

## 2021-06-27 PROCEDURE — 11000001 HC ACUTE MED/SURG PRIVATE ROOM

## 2021-06-27 RX ADMIN — HYDROCODONE BITARTRATE AND ACETAMINOPHEN 1 TABLET: 5; 325 TABLET ORAL at 10:06

## 2021-06-27 RX ADMIN — IBUPROFEN 600 MG: 600 TABLET, FILM COATED ORAL at 12:06

## 2021-06-27 RX ADMIN — IBUPROFEN 600 MG: 600 TABLET, FILM COATED ORAL at 05:06

## 2021-06-27 RX ADMIN — IBUPROFEN 600 MG: 600 TABLET, FILM COATED ORAL at 11:06

## 2021-06-28 VITALS
TEMPERATURE: 98 F | RESPIRATION RATE: 18 BRPM | DIASTOLIC BLOOD PRESSURE: 53 MMHG | SYSTOLIC BLOOD PRESSURE: 90 MMHG | OXYGEN SATURATION: 98 % | HEART RATE: 65 BPM

## 2021-06-28 PROBLEM — R55 VASOVAGAL SYNCOPE: Status: RESOLVED | Noted: 2021-06-07 | Resolved: 2021-06-28

## 2021-06-28 PROBLEM — R82.71 GBS BACTERIURIA: Status: RESOLVED | Noted: 2020-12-30 | Resolved: 2021-06-28

## 2021-06-28 PROCEDURE — 25000003 PHARM REV CODE 250: Performed by: MIDWIFE

## 2021-06-28 PROCEDURE — 99238 HOSP IP/OBS DSCHRG MGMT 30/<: CPT | Mod: ,,, | Performed by: MIDWIFE

## 2021-06-28 PROCEDURE — 99238 PR HOSPITAL DISCHARGE DAY,<30 MIN: ICD-10-PCS | Mod: ,,, | Performed by: MIDWIFE

## 2021-06-28 RX ORDER — IBUPROFEN 600 MG/1
600 TABLET ORAL EVERY 6 HOURS PRN
Qty: 30 TABLET | Refills: 0 | Status: SHIPPED | OUTPATIENT
Start: 2021-06-28 | End: 2021-07-30

## 2021-06-28 RX ORDER — IBUPROFEN 600 MG/1
600 TABLET ORAL EVERY 6 HOURS PRN
Qty: 30 TABLET | Refills: 0 | Status: SHIPPED | OUTPATIENT
Start: 2021-06-28 | End: 2021-06-28

## 2021-06-28 RX ADMIN — IBUPROFEN 600 MG: 600 TABLET, FILM COATED ORAL at 05:06

## 2021-07-07 NOTE — PATIENT INSTRUCTIONS
Assessment:  Rick Sierra is a 19 y.o. female s/p left knee ACL reconstruction with quad tendon autograft and medial meniscus repair.       Encounter Diagnoses   Name Primary?    S/P ACL reconstruction Yes    S/P medial meniscus repair of left knee         Plan:   Continue physical therapy   Follow up in 2 weeks.       Follow-up: 2 weeks recheck or sooner if there are any problems between now and then.    Thank you for choosing [facility] and Dr. Jerson Torres for your orthopedic & sports medicine care. It is our goal to provide you with exceptional care that will help keep you healthy, active, and get you back in the game.    If you felt that you received exemplary care today, please consider leaving us feedback on [employer] at [email or web address]     Please do not hesitate to reach out to us via email, phone, or MyChart with any questions, concerns, or feedback.    If you are experiencing pain/discomfort ,or have questions after 5pm and would like to be connected to the [facility] [place] on-call team, please call this number and specify which Sports Medicine provider is treating you: [phone]     
See HPI
See HPI

## 2021-07-30 ENCOUNTER — POSTPARTUM VISIT (OUTPATIENT)
Dept: OBSTETRICS AND GYNECOLOGY | Facility: CLINIC | Age: 21
End: 2021-07-30
Payer: MEDICAID

## 2021-07-30 VITALS
DIASTOLIC BLOOD PRESSURE: 70 MMHG | BODY MASS INDEX: 22.07 KG/M2 | SYSTOLIC BLOOD PRESSURE: 94 MMHG | HEIGHT: 62 IN | WEIGHT: 119.94 LBS

## 2021-07-30 DIAGNOSIS — Z30.013 ENCOUNTER FOR INITIAL PRESCRIPTION OF INJECTABLE CONTRACEPTIVE: ICD-10-CM

## 2021-07-30 PROBLEM — O09.219 HX OF PRECIPITOUS LABOR AND DELIVERIES, ANTEPARTUM: Status: ACTIVE | Noted: 2021-06-26

## 2021-07-30 PROBLEM — M25.669 IMPAIRED RANGE OF MOTION OF KNEE: Status: RESOLVED | Noted: 2020-01-09 | Resolved: 2021-07-30

## 2021-07-30 PROBLEM — Z74.09 DECREASED STRENGTH, ENDURANCE, AND MOBILITY: Status: RESOLVED | Noted: 2020-01-09 | Resolved: 2021-07-30

## 2021-07-30 PROBLEM — R68.89 DECREASED STRENGTH, ENDURANCE, AND MOBILITY: Status: RESOLVED | Noted: 2020-01-09 | Resolved: 2021-07-30

## 2021-07-30 PROBLEM — R53.1 DECREASED STRENGTH, ENDURANCE, AND MOBILITY: Status: RESOLVED | Noted: 2020-01-09 | Resolved: 2021-07-30

## 2021-07-30 PROCEDURE — 99999 PR PBB SHADOW E&M-EST. PATIENT-LVL II: ICD-10-PCS | Mod: PBBFAC,,, | Performed by: ADVANCED PRACTICE MIDWIFE

## 2021-07-30 PROCEDURE — 99212 OFFICE O/P EST SF 10 MIN: CPT | Mod: PBBFAC,25 | Performed by: ADVANCED PRACTICE MIDWIFE

## 2021-07-30 PROCEDURE — 59430 PR CARE AFTER DELIVERY ONLY: ICD-10-PCS | Mod: TH,,, | Performed by: ADVANCED PRACTICE MIDWIFE

## 2021-07-30 PROCEDURE — 96372 THER/PROPH/DIAG INJ SC/IM: CPT | Mod: PBBFAC

## 2021-07-30 PROCEDURE — 99999 PR PBB SHADOW E&M-EST. PATIENT-LVL II: CPT | Mod: PBBFAC,,, | Performed by: ADVANCED PRACTICE MIDWIFE

## 2021-07-30 RX ORDER — MEDROXYPROGESTERONE ACETATE 150 MG/ML
150 INJECTION, SUSPENSION INTRAMUSCULAR
Status: ACTIVE | OUTPATIENT
Start: 2021-07-30 | End: 2022-07-25

## 2021-07-30 RX ADMIN — MEDROXYPROGESTERONE ACETATE 150 MG: 150 INJECTION, SUSPENSION, EXTENDED RELEASE INTRAMUSCULAR at 01:07

## 2021-08-06 ENCOUNTER — PATIENT MESSAGE (OUTPATIENT)
Dept: OBSTETRICS AND GYNECOLOGY | Facility: CLINIC | Age: 21
End: 2021-08-06

## 2021-08-24 ENCOUNTER — PATIENT MESSAGE (OUTPATIENT)
Dept: OBSTETRICS AND GYNECOLOGY | Facility: CLINIC | Age: 21
End: 2021-08-24

## 2021-10-15 ENCOUNTER — CLINICAL SUPPORT (OUTPATIENT)
Dept: OBSTETRICS AND GYNECOLOGY | Facility: CLINIC | Age: 21
End: 2021-10-15
Payer: MEDICAID

## 2021-10-15 PROCEDURE — 96372 THER/PROPH/DIAG INJ SC/IM: CPT | Mod: PBBFAC

## 2021-10-15 PROCEDURE — 99211 OFF/OP EST MAY X REQ PHY/QHP: CPT | Mod: PBBFAC

## 2021-10-15 PROCEDURE — 99999 PR PBB SHADOW E&M-EST. PATIENT-LVL I: ICD-10-PCS | Mod: PBBFAC,,,

## 2021-10-15 PROCEDURE — 99999 PR PBB SHADOW E&M-EST. PATIENT-LVL I: CPT | Mod: PBBFAC,,,

## 2021-10-15 RX ADMIN — MEDROXYPROGESTERONE ACETATE 150 MG: 150 INJECTION, SUSPENSION, EXTENDED RELEASE INTRAMUSCULAR at 02:10

## 2022-08-12 ENCOUNTER — OFFICE VISIT (OUTPATIENT)
Dept: OBSTETRICS AND GYNECOLOGY | Facility: CLINIC | Age: 22
End: 2022-08-12
Payer: MEDICAID

## 2022-08-12 VITALS
DIASTOLIC BLOOD PRESSURE: 70 MMHG | HEIGHT: 62 IN | BODY MASS INDEX: 19.31 KG/M2 | SYSTOLIC BLOOD PRESSURE: 100 MMHG | WEIGHT: 104.94 LBS

## 2022-08-12 DIAGNOSIS — Z01.419 ENCOUNTER FOR ANNUAL ROUTINE GYNECOLOGICAL EXAMINATION: Primary | ICD-10-CM

## 2022-08-12 DIAGNOSIS — Z30.013 ENCOUNTER FOR INITIAL PRESCRIPTION OF INJECTABLE CONTRACEPTIVE: ICD-10-CM

## 2022-08-12 DIAGNOSIS — Z11.3 SCREEN FOR STD (SEXUALLY TRANSMITTED DISEASE): ICD-10-CM

## 2022-08-12 LAB
B-HCG UR QL: NEGATIVE
CTP QC/QA: YES

## 2022-08-12 PROCEDURE — 99999 PR PBB SHADOW E&M-EST. PATIENT-LVL II: CPT | Mod: PBBFAC,,,

## 2022-08-12 PROCEDURE — 99999 PR PBB SHADOW E&M-EST. PATIENT-LVL II: ICD-10-PCS | Mod: PBBFAC,,,

## 2022-08-12 PROCEDURE — 99395 PREV VISIT EST AGE 18-39: CPT | Mod: S$PBB,,,

## 2022-08-12 PROCEDURE — 99212 OFFICE O/P EST SF 10 MIN: CPT | Mod: PBBFAC,25

## 2022-08-12 PROCEDURE — 3078F DIAST BP <80 MM HG: CPT | Mod: CPTII,,,

## 2022-08-12 PROCEDURE — 3074F SYST BP LT 130 MM HG: CPT | Mod: CPTII,,,

## 2022-08-12 PROCEDURE — 96372 THER/PROPH/DIAG INJ SC/IM: CPT | Mod: PBBFAC

## 2022-08-12 PROCEDURE — 1159F MED LIST DOCD IN RCRD: CPT | Mod: CPTII,,,

## 2022-08-12 PROCEDURE — 81025 URINE PREGNANCY TEST: CPT | Mod: PBBFAC

## 2022-08-12 PROCEDURE — 99395 PR PREVENTIVE VISIT,EST,18-39: ICD-10-PCS | Mod: S$PBB,,,

## 2022-08-12 PROCEDURE — 3074F PR MOST RECENT SYSTOLIC BLOOD PRESSURE < 130 MM HG: ICD-10-PCS | Mod: CPTII,,,

## 2022-08-12 PROCEDURE — 1159F PR MEDICATION LIST DOCUMENTED IN MEDICAL RECORD: ICD-10-PCS | Mod: CPTII,,,

## 2022-08-12 PROCEDURE — 3008F PR BODY MASS INDEX (BMI) DOCUMENTED: ICD-10-PCS | Mod: CPTII,,,

## 2022-08-12 PROCEDURE — 3008F BODY MASS INDEX DOCD: CPT | Mod: CPTII,,,

## 2022-08-12 PROCEDURE — 3078F PR MOST RECENT DIASTOLIC BLOOD PRESSURE < 80 MM HG: ICD-10-PCS | Mod: CPTII,,,

## 2022-08-12 RX ORDER — MEDROXYPROGESTERONE ACETATE 150 MG/ML
150 INJECTION, SUSPENSION INTRAMUSCULAR
Status: ACTIVE | OUTPATIENT
Start: 2022-08-12 | End: 2023-11-05

## 2022-08-12 RX ADMIN — MEDROXYPROGESTERONE ACETATE 150 MG: 150 INJECTION, SUSPENSION INTRAMUSCULAR at 03:08

## 2022-08-12 NOTE — PROGRESS NOTES
Subjective:       Patient ID: Rick Sierra is a 21 y.o. female.    Chief Complaint:  Contraception      History of Present Illness  HPI  Annual Exam-Premenopausal  Patient presents for annual exam. The patient has no complaints today. The patient is sexually active, 1 partner, condoms sometimes, interested in starting Depo injection. GYN screening history: no prior history of gyn screening tests. The patient wears seatbelts: yes. The patient participates in regular exercise: yes. Has the patient ever been transfused or tattooed?: no. The patient reports that there is not domestic violence in her life.    Cycles monthly, moderate flow, denies cramping  Currently on menstrual cycle, declines pelvic exam     GYN & OB History  Patient's last menstrual period was 2022.   Date of Last Pap: No result found    OB History    Para Term  AB Living   1 1 1     1   SAB IAB Ectopic Multiple Live Births         0 1      # Outcome Date GA Lbr Shaun/2nd Weight Sex Delivery Anes PTL Lv   1 Term 21 39w5d  3.04 kg (6 lb 11.2 oz) F Vag-Spont Local N CARRIE       Review of Systems  Review of Systems   Constitutional: Negative.    HENT: Negative.    Eyes: Negative.    Respiratory: Negative.    Cardiovascular: Negative.    Gastrointestinal: Negative.    Endocrine: Negative.    Genitourinary: Negative.    Musculoskeletal: Negative.    Integumentary:  Negative.   Neurological: Negative.    Hematological: Negative.    Psychiatric/Behavioral: Negative.    All other systems reviewed and are negative.  Breast: negative.            Objective:      Physical Exam:   Constitutional: She is oriented to person, place, and time. She appears well-developed and well-nourished. No distress.    HENT:   Head: Normocephalic and atraumatic.    Eyes: Pupils are equal, round, and reactive to light. Conjunctivae and EOM are normal.      Pulmonary/Chest: Effort normal.                  Musculoskeletal: Normal range of motion and  moves all extremeties.       Neurological: She is alert and oriented to person, place, and time.    Skin: Skin is warm and dry. No rash noted. She is not diaphoretic. No erythema. No pallor.    Psychiatric: She has a normal mood and affect. Her behavior is normal. Judgment and thought content normal.             Assessment:        1. Encounter for annual routine gynecological examination    2. Screen for STD (sexually transmitted disease)    3. Encounter for initial prescription of injectable contraceptive               Plan:   Continue annual well woman exam.   Discussed contraception options with patient including pills, patch, ring, Depo Provera, Implanon, Mirena, and Paragard.  Discussed risks of DVT development with birth control use.  Pt denies history of blood clots, DVT, cardiac issues, HTN, smoking, or migraines with an aura.  Pt denies family hx of DVT.  Pt chose Depo injection  Bleeding irregularities caused by depo discussed with pt and that sometimes it can take up to the fourth injection to know how she will respond to depo.  Black box warning of Depo discussed with pt. Recommended supplementation with calcium, vitamin D, and weight bearing exercises daily.  Aware pap smears start at 22    Diagnosis and orders this visit:  Encounter for annual routine gynecological examination    Screen for STD (sexually transmitted disease)  -     C. trachomatis/N. gonorrhoeae by AMP DNA    Encounter for initial prescription of injectable contraceptive  -     medroxyPROGESTERone (DEPO-PROVERA) injection 150 mg          Olga Vera NP

## 2022-08-12 NOTE — PROGRESS NOTES
Two patient identifiers verified. Patient notified to wait in clinic 15 minutes after injection, patient verbalized understanding. Depo provera administered IM to patient's left deltoid per pt's request. Patient tolerated well. Next injection scheduled.

## 2023-07-21 ENCOUNTER — LAB VISIT (OUTPATIENT)
Dept: LAB | Facility: HOSPITAL | Age: 23
End: 2023-07-21
Attending: NURSE PRACTITIONER
Payer: MEDICAID

## 2023-07-21 ENCOUNTER — OFFICE VISIT (OUTPATIENT)
Dept: PRIMARY CARE CLINIC | Facility: CLINIC | Age: 23
End: 2023-07-21
Payer: MEDICAID

## 2023-07-21 VITALS
SYSTOLIC BLOOD PRESSURE: 116 MMHG | HEART RATE: 84 BPM | WEIGHT: 112.19 LBS | BODY MASS INDEX: 20.65 KG/M2 | TEMPERATURE: 99 F | DIASTOLIC BLOOD PRESSURE: 74 MMHG | HEIGHT: 62 IN | OXYGEN SATURATION: 99 %

## 2023-07-21 DIAGNOSIS — Z00.00 GENERAL MEDICAL EXAM: ICD-10-CM

## 2023-07-21 DIAGNOSIS — Z11.3 SCREENING EXAMINATION FOR VENEREAL DISEASE: ICD-10-CM

## 2023-07-21 DIAGNOSIS — N91.2 AMENORRHEA: Primary | ICD-10-CM

## 2023-07-21 DIAGNOSIS — Z13.220 ENCOUNTER FOR LIPID SCREENING FOR CARDIOVASCULAR DISEASE: ICD-10-CM

## 2023-07-21 DIAGNOSIS — Z12.4 SCREENING FOR CERVICAL CANCER: ICD-10-CM

## 2023-07-21 DIAGNOSIS — Z11.4 SCREENING FOR HIV (HUMAN IMMUNODEFICIENCY VIRUS): ICD-10-CM

## 2023-07-21 DIAGNOSIS — Z11.59 ENCOUNTER FOR HEPATITIS C SCREENING TEST FOR LOW RISK PATIENT: ICD-10-CM

## 2023-07-21 DIAGNOSIS — Z13.6 ENCOUNTER FOR LIPID SCREENING FOR CARDIOVASCULAR DISEASE: ICD-10-CM

## 2023-07-21 DIAGNOSIS — Z86.39 HISTORY OF METABOLIC AND NUTRITIONAL DISORDER: ICD-10-CM

## 2023-07-21 LAB
B-HCG UR QL: NEGATIVE
BASOPHILS # BLD AUTO: 0.04 K/UL (ref 0–0.2)
BASOPHILS NFR BLD: 0.6 % (ref 0–1.9)
CTP QC/QA: YES
DIFFERENTIAL METHOD: ABNORMAL
EOSINOPHIL # BLD AUTO: 0.1 K/UL (ref 0–0.5)
EOSINOPHIL NFR BLD: 0.7 % (ref 0–8)
ERYTHROCYTE [DISTWIDTH] IN BLOOD BY AUTOMATED COUNT: 12.1 % (ref 11.5–14.5)
HCT VFR BLD AUTO: 40.5 % (ref 37–48.5)
HGB BLD-MCNC: 13.6 G/DL (ref 12–16)
IMM GRANULOCYTES # BLD AUTO: 0.02 K/UL (ref 0–0.04)
IMM GRANULOCYTES NFR BLD AUTO: 0.3 % (ref 0–0.5)
LYMPHOCYTES # BLD AUTO: 2.4 K/UL (ref 1–4.8)
LYMPHOCYTES NFR BLD: 33.9 % (ref 18–48)
MCH RBC QN AUTO: 31.1 PG (ref 27–31)
MCHC RBC AUTO-ENTMCNC: 33.6 G/DL (ref 32–36)
MCV RBC AUTO: 93 FL (ref 82–98)
MONOCYTES # BLD AUTO: 0.6 K/UL (ref 0.3–1)
MONOCYTES NFR BLD: 8 % (ref 4–15)
NEUTROPHILS # BLD AUTO: 4 K/UL (ref 1.8–7.7)
NEUTROPHILS NFR BLD: 56.5 % (ref 38–73)
NRBC BLD-RTO: 0 /100 WBC
PLATELET # BLD AUTO: 265 K/UL (ref 150–450)
PMV BLD AUTO: 10.8 FL (ref 9.2–12.9)
RBC # BLD AUTO: 4.38 M/UL (ref 4–5.4)
WBC # BLD AUTO: 7.02 K/UL (ref 3.9–12.7)

## 2023-07-21 PROCEDURE — 36415 COLL VENOUS BLD VENIPUNCTURE: CPT | Mod: PN | Performed by: NURSE PRACTITIONER

## 2023-07-21 PROCEDURE — 3074F SYST BP LT 130 MM HG: CPT | Mod: CPTII,,, | Performed by: NURSE PRACTITIONER

## 2023-07-21 PROCEDURE — 3074F PR MOST RECENT SYSTOLIC BLOOD PRESSURE < 130 MM HG: ICD-10-PCS | Mod: CPTII,,, | Performed by: NURSE PRACTITIONER

## 2023-07-21 PROCEDURE — 3078F DIAST BP <80 MM HG: CPT | Mod: CPTII,,, | Performed by: NURSE PRACTITIONER

## 2023-07-21 PROCEDURE — 3044F PR MOST RECENT HEMOGLOBIN A1C LEVEL <7.0%: ICD-10-PCS | Mod: CPTII,,, | Performed by: NURSE PRACTITIONER

## 2023-07-21 PROCEDURE — 99999 PR PBB SHADOW E&M-EST. PATIENT-LVL IV: CPT | Mod: PBBFAC,,, | Performed by: NURSE PRACTITIONER

## 2023-07-21 PROCEDURE — 99204 PR OFFICE/OUTPT VISIT, NEW, LEVL IV, 45-59 MIN: ICD-10-PCS | Mod: S$PBB,,, | Performed by: NURSE PRACTITIONER

## 2023-07-21 PROCEDURE — 1160F PR REVIEW ALL MEDS BY PRESCRIBER/CLIN PHARMACIST DOCUMENTED: ICD-10-PCS | Mod: CPTII,,, | Performed by: NURSE PRACTITIONER

## 2023-07-21 PROCEDURE — 80061 LIPID PANEL: CPT | Performed by: NURSE PRACTITIONER

## 2023-07-21 PROCEDURE — 87591 N.GONORRHOEAE DNA AMP PROB: CPT | Performed by: NURSE PRACTITIONER

## 2023-07-21 PROCEDURE — 84443 ASSAY THYROID STIM HORMONE: CPT | Performed by: NURSE PRACTITIONER

## 2023-07-21 PROCEDURE — 85025 COMPLETE CBC W/AUTO DIFF WBC: CPT | Performed by: NURSE PRACTITIONER

## 2023-07-21 PROCEDURE — 84481 FREE ASSAY (FT-3): CPT | Performed by: NURSE PRACTITIONER

## 2023-07-21 PROCEDURE — 81025 URINE PREGNANCY TEST: CPT | Mod: PBBFAC,PN | Performed by: NURSE PRACTITIONER

## 2023-07-21 PROCEDURE — 80053 COMPREHEN METABOLIC PANEL: CPT | Performed by: NURSE PRACTITIONER

## 2023-07-21 PROCEDURE — 83036 HEMOGLOBIN GLYCOSYLATED A1C: CPT | Performed by: NURSE PRACTITIONER

## 2023-07-21 PROCEDURE — 99999PBSHW POCT URINE PREGNANCY: Mod: PBBFAC,,,

## 2023-07-21 PROCEDURE — 3008F PR BODY MASS INDEX (BMI) DOCUMENTED: ICD-10-PCS | Mod: CPTII,,, | Performed by: NURSE PRACTITIONER

## 2023-07-21 PROCEDURE — 1159F MED LIST DOCD IN RCRD: CPT | Mod: CPTII,,, | Performed by: NURSE PRACTITIONER

## 2023-07-21 PROCEDURE — 99999PBSHW POCT URINE PREGNANCY: ICD-10-PCS | Mod: PBBFAC,,,

## 2023-07-21 PROCEDURE — 99214 OFFICE O/P EST MOD 30 MIN: CPT | Mod: PBBFAC,PN | Performed by: NURSE PRACTITIONER

## 2023-07-21 PROCEDURE — 3078F PR MOST RECENT DIASTOLIC BLOOD PRESSURE < 80 MM HG: ICD-10-PCS | Mod: CPTII,,, | Performed by: NURSE PRACTITIONER

## 2023-07-21 PROCEDURE — 3044F HG A1C LEVEL LT 7.0%: CPT | Mod: CPTII,,, | Performed by: NURSE PRACTITIONER

## 2023-07-21 PROCEDURE — 84439 ASSAY OF FREE THYROXINE: CPT | Performed by: NURSE PRACTITIONER

## 2023-07-21 PROCEDURE — 3008F BODY MASS INDEX DOCD: CPT | Mod: CPTII,,, | Performed by: NURSE PRACTITIONER

## 2023-07-21 PROCEDURE — 1160F RVW MEDS BY RX/DR IN RCRD: CPT | Mod: CPTII,,, | Performed by: NURSE PRACTITIONER

## 2023-07-21 PROCEDURE — 99999 PR PBB SHADOW E&M-EST. PATIENT-LVL IV: ICD-10-PCS | Mod: PBBFAC,,, | Performed by: NURSE PRACTITIONER

## 2023-07-21 PROCEDURE — 87389 HIV-1 AG W/HIV-1&-2 AB AG IA: CPT | Performed by: NURSE PRACTITIONER

## 2023-07-21 PROCEDURE — 86803 HEPATITIS C AB TEST: CPT | Performed by: NURSE PRACTITIONER

## 2023-07-21 PROCEDURE — 99204 OFFICE O/P NEW MOD 45 MIN: CPT | Mod: S$PBB,,, | Performed by: NURSE PRACTITIONER

## 2023-07-21 PROCEDURE — 1159F PR MEDICATION LIST DOCUMENTED IN MEDICAL RECORD: ICD-10-PCS | Mod: CPTII,,, | Performed by: NURSE PRACTITIONER

## 2023-07-21 NOTE — PROGRESS NOTES
Subjective:       Patient ID: Rick Sierra is a 22 y.o. female.    Chief Complaint: Establish Care      History of Present Illness:   Rick Sierra 22 y.o. female presents today presents today to address care gaps and establish care. Denies any other problems or concerns at this time. Treatment options and alternatives were discussed with the patient. Patient provided opportunity to ask additional questions.  All questions were answered. Voices understanding and acceptance of this advice. Instructed to call back if any further questions or concerns.     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     Facility-Administered Encounter Medications as of 7/21/2023   Medication Dose Route Frequency Provider Last Rate Last Admin    medroxyPROGESTERone (DEPO-PROVERA) injection 150 mg  150 mg Intramuscular Q90 Days Olga Vera NP   150 mg at 08/12/22 1530       Review of Systems   Constitutional:  Negative for appetite change, chills and fever.   HENT:  Negative for ear pain, sinus pressure, sore throat and trouble swallowing.    Eyes:  Negative for visual disturbance.  "  Respiratory:  Negative for shortness of breath.    Cardiovascular:  Negative for chest pain.   Gastrointestinal:  Negative for abdominal pain, diarrhea, nausea and vomiting.   Endocrine: Negative for cold intolerance, polyphagia and polyuria.   Genitourinary:  Positive for menstrual problem. Negative for decreased urine volume and dysuria.   Musculoskeletal:  Negative for back pain.   Skin:  Negative for rash.   Allergic/Immunologic: Negative for environmental allergies and food allergies.   Neurological:  Negative for dizziness, tremors, weakness and numbness.   Hematological:  Does not bruise/bleed easily.   Psychiatric/Behavioral:  Negative for confusion and hallucinations. The patient is not nervous/anxious and is not hyperactive.    All other systems reviewed and are negative.      Objective:      /74 (BP Location: Right arm, Patient Position: Sitting, BP Method: Medium (Manual))   Pulse 84   Temp 98.9 °F (37.2 °C) (Oral)   Ht 5' 2" (1.575 m)   Wt 50.9 kg (112 lb 3.2 oz)   LMP 06/17/2023 (Exact Date)   SpO2 99%   BMI 20.52 kg/m²   Physical Exam  Constitutional:       Appearance: She is well-developed. She is obese.   HENT:      Head: Normocephalic.      Nose: Nose normal.   Eyes:      Pupils: Pupils are equal, round, and reactive to light.   Cardiovascular:      Rate and Rhythm: Normal rate.      Heart sounds: Normal heart sounds.   Pulmonary:      Effort: Pulmonary effort is normal.      Breath sounds: Normal breath sounds.   Abdominal:      General: Bowel sounds are normal.      Palpations: Abdomen is soft.   Musculoskeletal:         General: Normal range of motion.      Cervical back: Normal range of motion.   Skin:     General: Skin is warm and dry.   Neurological:      Mental Status: She is alert and oriented to person, place, and time.   Psychiatric:         Behavior: Behavior normal.         Results for orders placed or performed in visit on 07/21/23   C. trachomatis/N. gonorrhoeae by AMP " DNA    Specimen: Genital   Result Value Ref Range    Chlamydia, Amplified DNA Not Detected Not Detected    N gonorrhoeae, amplified DNA Not Detected Not Detected   POCT Urine Pregnancy   Result Value Ref Range    POC Preg Test, Ur Negative Negative     Acceptable Yes      Assessment:       1. Amenorrhea    2. General medical exam    3. History of metabolic and nutritional disorder    4. Encounter for lipid screening for cardiovascular disease    5. Screening for HIV (human immunodeficiency virus)    6. Screening for cervical cancer    7. Screening examination for venereal disease    8. Encounter for hepatitis C screening test for low risk patient        Plan:   Rick was seen today for [facility] care.    Diagnoses and all orders for this visit:    Amenorrhea  -     POCT Urine Pregnancy    General medical exam  -     C. trachomatis/N. gonorrhoeae by AMP DNA  -     CBC Auto Differential; Future  -     Comprehensive Metabolic Panel; Future    History of metabolic and nutritional disorder  -     Hemoglobin A1C; Future  -     TSH; Future  -     T3, Free; Future  -     T4, Free; Future    Encounter for lipid screening for cardiovascular disease  -     Lipid Panel; Future    Screening for HIV (human immunodeficiency virus)  -     HIV 1/2 Ag/Ab (4th Gen); Future    Screening for cervical cancer  -     Ambulatory referral/consult to Gynecology; Future  -     Ambulatory referral/consult to Gynecology; Future    Screening examination for venereal disease    Encounter for hepatitis C screening test for low risk patient  -     Hepatitis C Antibody; Future              [facility]   [address]  [place] [address]  Office [phone]  Fax [phone]

## 2023-07-22 LAB
ALBUMIN SERPL BCP-MCNC: 4.5 G/DL (ref 3.5–5.2)
ALP SERPL-CCNC: 64 U/L (ref 55–135)
ALT SERPL W/O P-5'-P-CCNC: 9 U/L (ref 10–44)
ANION GAP SERPL CALC-SCNC: 12 MMOL/L (ref 8–16)
AST SERPL-CCNC: 24 U/L (ref 10–40)
BILIRUB SERPL-MCNC: 0.3 MG/DL (ref 0.1–1)
BUN SERPL-MCNC: 10 MG/DL (ref 6–20)
C TRACH DNA SPEC QL NAA+PROBE: NOT DETECTED
CALCIUM SERPL-MCNC: 10 MG/DL (ref 8.7–10.5)
CHLORIDE SERPL-SCNC: 105 MMOL/L (ref 95–110)
CHOLEST SERPL-MCNC: 144 MG/DL (ref 120–199)
CHOLEST/HDLC SERPL: 2.6 {RATIO} (ref 2–5)
CO2 SERPL-SCNC: 23 MMOL/L (ref 23–29)
CREAT SERPL-MCNC: 0.8 MG/DL (ref 0.5–1.4)
EST. GFR  (NO RACE VARIABLE): >60 ML/MIN/1.73 M^2
ESTIMATED AVG GLUCOSE: 105 MG/DL (ref 68–131)
GLUCOSE SERPL-MCNC: 89 MG/DL (ref 70–110)
HBA1C MFR BLD: 5.3 % (ref 4–5.6)
HCV AB SERPL QL IA: NORMAL
HDLC SERPL-MCNC: 55 MG/DL (ref 40–75)
HDLC SERPL: 38.2 % (ref 20–50)
HIV 1+2 AB+HIV1 P24 AG SERPL QL IA: NORMAL
LDLC SERPL CALC-MCNC: 75.4 MG/DL (ref 63–159)
N GONORRHOEA DNA SPEC QL NAA+PROBE: NOT DETECTED
NONHDLC SERPL-MCNC: 89 MG/DL
POTASSIUM SERPL-SCNC: 4.1 MMOL/L (ref 3.5–5.1)
PROT SERPL-MCNC: 8.2 G/DL (ref 6–8.4)
SODIUM SERPL-SCNC: 140 MMOL/L (ref 136–145)
T3FREE SERPL-MCNC: 3.1 PG/ML (ref 2.3–4.2)
T4 FREE SERPL-MCNC: 0.89 NG/DL (ref 0.71–1.51)
TRIGL SERPL-MCNC: 68 MG/DL (ref 30–150)
TSH SERPL DL<=0.005 MIU/L-ACNC: 1.24 UIU/ML (ref 0.4–4)

## 2023-08-15 ENCOUNTER — TELEPHONE (OUTPATIENT)
Dept: OBSTETRICS AND GYNECOLOGY | Facility: CLINIC | Age: 23
End: 2023-08-15
Payer: MEDICAID

## 2023-08-15 NOTE — TELEPHONE ENCOUNTER
----- Message from Shirlene Falk sent at 8/14/2023  4:31 PM CDT -----  Good afternoon, Please assist with scheduling Ms. Sierra. She has a referral for GYN.  Patient is established with Ms. Sonia Vera, WILLIAM.   [phone] (home)            DX:Screening for cervical cancer [Z12.4]        Thanks,    Shirlene COUCH

## 2023-08-15 NOTE — TELEPHONE ENCOUNTER
----- Message from Shirlene Falk sent at 8/14/2023  4:31 PM CDT -----  Good afternoon, Please assist with scheduling Ms. Sierra. She has a referral for GYN.  Patient is established with Ms. Sonia Vera, [place].   [phone] (home)            DX:Screening for cervical cancer [Z12.4]        Thanks,    Shirlene COUCH

## 2023-08-15 NOTE — TELEPHONE ENCOUNTER
Patient scheduled to see Olga Vera NP for annual on 9/11/2023 at 9:45 am at the [facility]. Patient verbalized understanding of her appointment time, date, and location.

## 2023-08-15 NOTE — PROGRESS NOTES
Physical Therapy Daily Treatment Note     Name: Rick Sierra  Clinic Number: [id number]    Therapy Diagnosis:   Encounter Diagnoses   Name Primary?    Decreased strength, endurance, and mobility     Impaired range of motion of knee      Physician: Jerson Torres MD    Visit Date: 8/20/2020    Physician Orders: PT Eval and Treat  Medical Diagnosis from Referral: Rupture of anterior cruciate ligament of left knee subsequent encounter  Evaluation Date: 6/4/2020  Authorization Period Expiration: 12/31/2020  Plan of Care Expiration: 8/28/2020  Visit # / Visits authorized: 19/ 30    Time In: 11:45AM  Time Out: 12:32AM  Total Billable Time: 47 minutes    Precautions: Standard and L ACL reconstruction with partial thickness quadriceps tendon autograft and medial meniscus repair 6/2/2020    Subjective     Pt reports: that she is feeling fine today reporting no pain or significant stiffness present as seen in last visit. Pt reports that she no longer requires rest breaks at work due to pain, stiffness, swelling or fatigue for the last week.      She was compliant with home exercise program.  Response to previous treatment: Improved range of motion and quad activation  Functional change: Improved knee range of motion, quad activation and gait pattern although compensatory strategies are still present. No sitting breaks required at work anymore    Pain: 0/10  Location: left knee      Objective     Objective information below is from initial evaluation unless bolded today.     Range of Motion/Strength:         Knee Right Left Pain/Dysfunction with Movement Goal   PROM           Flexion (135)  140  129 Pinching in left knee at end range 140 No pain   Extension (0)  3 hyperextension 1 hyperextension   No pain 0 No pain       L/E MMT Right Left Pain/Dysfunction with Movement Goal   Hip Flexion 4+/5 3/5 No pain 4+/5 B No pain   Hip Extension 4+/5 4-/5 No pain 4+/5 B No pain   Hip Abduction 4+/5 4-/5 No pain 4+/5 B No pain    Hip Adduction 4+/5 4-/5 No pain 4+/5 B No pain   Knee Flexion 4+/5 4-/5 No pain 4+/5 B No pain   Knee Extension 4+/5 3+/5 Pain present in left knee 4+/5 B No pain     Rick received therapeutic exercises to develop strength, endurance, ROM, flexibility, posture and core stabilization for 20 minutes including:   Sitting long arc quads concentric and end range hold for 2 seconds followed by eccentric lowering 3x10 reps with 1 lb leg weight  Straight leg raises into flexion 2x12 reps with 1 lb leg weights  Straight leg raises into abduction 2x12 reps, into adduction 2x12 reps with 1 lb leg weights  Standing hamstring curls 2x10 reps with 4 lb leg weight deferred today   Standing hip abduction and (hip extension deferred today) 2x10 reps on each lower extremity deferred today   PROM knee flexion for 5 minutes deferred today   Standing heel raises 3x10 reps deferred today  Recumbent bike working on improving functional range of motion and strength for 5 minutes  Single leg bridges with ab bracing 2x12 reps with emphasis on neutral pelvis   Standing total knee extension with blue theraband 3x10 reps     Rick participated in dynamic functional therapeutic activities to improve functional performance for 19 minutes, including:  Step ups onto 6 inch step 2x12 reps with left lower extremity  Step downs from 6 inch step 2x12 reps with left lower extremity as the stance leg  Mini squats to 18 inch box 3x10 reps with emphasis on equal weightbearing   Wall squats 3x10 reps     Rick received the following manual therapy techniques: Soft tissue Mobilization were applied to the: left knee for 8 minutes, including:  Edema massage to alleviate pain and decrease swelling    Home Exercises Provided and Patient Education Provided     Education provided:   - Pt educated to continue working on HEP exercises and really working to improve knee extension as it is crucial during this stage of healing.     Written Home Exercises  Provided: Patient instructed to cont prior HEP.  Exercises were reviewed and Rick was able to demonstrate them prior to the end of the session.  Rick demonstrated good  understanding of the education provided.     See EMR under Patient Instructions for exercises provided 6/4/2020.    Assessment     Progressed all strengthening exercises to incorporated additional weight and repetitions today with good muscular fatigue present. Overall the pt is improving well with significant improvements in knee range of motion and improvements in all muscle groups for strength in her lower extremities.     Rick is progressing well towards her goals.   Pt prognosis is Good.     Pt will continue to benefit from skilled outpatient physical therapy to address the deficits listed in the problem list box on initial evaluation, provide pt/family education and to maximize pt's level of independence in the home and community environment.     Pt's spiritual, cultural and educational needs considered and pt agreeable to plan of care and goals.     Anticipated barriers to physical therapy: none    Goals:   Short Term Goals:  6 weeks   1. Pain: Pt will demonstrate improved pain by reports of less than or equal to 5/10 worst pain on the verbal rating scale in order to progress toward maximal functional ability and improve QOL. Met 7/7/2020   2. Function: Patient will demonstrate improved function as indicated by a score of less than or equal to 75% impairment on FOTO.    3. Mobility: Patient will improve AROM knee to 50% of stated goals, listed in objective measures above, in order to progress towards independence with functional activities. Met 7/7/2020   4. Strength: Patient will improve strength to 50% of stated goals, listed in objective measures above, in order to progress towards independence with functional activities. Improving 7/7/2020   5. Gait: Patient will demonstrate improved gait mechanics including partial weightbearing  with axillary crutches in order to improve functional mobility, improve quality of life.  Met   6. HEP: Patient will demonstrate independence with HEP in order to progress toward functional independence. Improving 7/7/2020      Long Term Goals:  12 months   1. Pain: Pt will demonstrate improved pain by reports of less than or equal to 0/10 worst pain on the verbal rating scale in order to progress toward maximal functional ability and improve QOL.  Met 8/20/2020   2. Function: Patient will demonstrate improved function as indicated by a score of less than or equal to 50% impairment on FOTO.    3. Mobility: Patient will improve AROM to stated goals, listed in objective measures above, in order to return to maximal functional potential and improve quality of life. Met 8/20/2020   4. Strength: Patient will improve strength to stated goals, listed in objective measures above, in order to improve functional independence and quality of life. Progressing 8/20/2020   5. Gait: Patient will demonstrate normalized gait mechanics without axillary crutches with minimal compensation in order to return to PLOF. Progressing 8/20/2020   6. Patient will return to normal recreational activities with less than or equal to 0/10 pain and maximal function.    7. Patient will be able to perform single leg hop test with 95% of opposite leg to decrease chances of re injury when returning to regular activities.   8. Patient will be able to perform single leg vertical jump with 95% of opposite leg to decrease chances of re injury when returning to regular activities.   9. Patient will be able to perform timed 60 second single leg squat test with 90% of opposite leg  to decrease chances of re injury when returning to regular activities.       Plan     Continue POC and frequency as planned. Progress passive range of motion and quad strengthening as tolerated by the pt.      These services are reasonable and necessary for the conditions set forth  above while under my care.    Shaina Cabrera, PT, DPT     Quality 111:Pneumonia Vaccination Status For Older Adults: Patient received any pneumococcal conjugate or polysaccharide vaccine on or after their 60th birthday and before the end of the measurement period Quality 110: Preventive Care And Screening: Influenza Immunization: Influenza Immunization not Administered because Patient Refused. Detail Level: Detailed

## 2025-04-24 ENCOUNTER — TELEPHONE (OUTPATIENT)
Dept: OBSTETRICS AND GYNECOLOGY | Facility: CLINIC | Age: 25
End: 2025-04-24
Payer: COMMERCIAL

## 2025-04-24 NOTE — TELEPHONE ENCOUNTER
Pt called in regards to appt. Pt states she is pregnant and has been bleeding. Pt had gone to [facility] clinic to be assessed and was told she was to early to determined an MAB, Pt states she Bleeding stopped and now has restarted. States she is wearing a pad. Bleeding is bright red like a cycle. Advised pt to report to [facility] ED to be assessed. Pt FAN WALLIS, FRANKLINN  OB/GYN

## 2025-04-30 ENCOUNTER — NURSE TRIAGE (OUTPATIENT)
Dept: ADMINISTRATIVE | Facility: CLINIC | Age: 25
End: 2025-04-30
Payer: COMMERCIAL

## 2025-04-30 NOTE — TELEPHONE ENCOUNTER
"Connection lost and triage nurse called the patient back.Pt advised to be seen within 24 hours. Central scheduling unable to schedule an appt and message sent to PCP office. Please contact the patient for a same day appointment.Reason for Disposition   [1] Chest pain lasts < 5 minutes AND [2] NO chest pain or cardiac symptoms (e.g., breathing difficulty, sweating) now  (Exception: Chest pains that last only a few seconds.)    Additional Information   Negative: SEVERE difficulty breathing (e.g., struggling for each breath, speaks in single words)   Negative: Difficult to awaken or acting confused (e.g., disoriented, slurred speech)   Negative: Shock suspected (e.g., cold/pale/clammy skin, too weak to stand, low BP, rapid pulse)   Negative: Passed out (e.g., fainted, lost consciousness, blacked out and was not responding)   Negative: [1] Chest pain lasts > 5 minutes AND [2] age > 44   Negative: [1] Chest pain lasts > 5 minutes AND [2] age > 30 AND [3] one or more cardiac risk factors (e.g., diabetes, high blood pressure, high cholesterol, obesity with BMI 30 or higher, smoker, or strong family history of heart disease)   Negative: [1] Chest pain lasts > 5 minutes AND [2] history of heart disease (i.e., angina, heart attack, heart failure, bypass surgery, takes nitroglycerin)   Negative: [1] Chest pain lasts > 5 minutes AND [2] described as crushing, pressure-like, or heavy   Negative: Heart beating < 50 beats per minute OR > 140 beats per minute   Negative: Visible sweat on face or sweat dripping down face   Negative: Sounds like a life-threatening emergency to the triager   Negative: SEVERE chest pain   Negative: [1] Chest pain (or "angina") comes and goes AND [2] is happening more often (increasing in frequency) or getting worse (increasing in severity)  (Exception: Chest pains that last only a few seconds.)   Negative: Pain also in shoulder(s) or arm(s) or jaw  (Exception: Pain is clearly made worse by movement.)   " "Negative: Difficulty breathing   Negative: Feeling weak or lightheaded (e.g., woozy, feeling like they might faint)   Negative: Coughing up blood   Negative: Cocaine use within last 3 days   Negative: Major surgery in past month   Negative: Hip or leg fracture (broken bone) in past month (or had cast on leg or ankle in past month)   Negative: Illness requiring prolonged bedrest in past month (e.g., immobilization, long hospital stay)   Negative: Long-distance travel in past month (e.g., car, bus, train, plane; with trip lasting 6 or more hours)   Negative: History of prior "blood clot" in leg or lungs (i.e., deep vein thrombosis, pulmonary embolism)   Negative: History of inherited increased risk of blood clots (e.g., Factor 5 Leiden, Anti-thrombin 3, Protein C or Protein S deficiency, Prothrombin mutation)   Negative: Cancer treatment in past six months (or has cancer now)   Negative: [1] Chest pain lasts > 5 minutes AND [2] occurred in past 3 days (72 hours) (Exception: Feels exactly the same as previously diagnosed heartburn and has accompanying sour taste in mouth.)   Negative: Taking a deep breath makes pain worse   Negative: Patient sounds very sick or weak to the triager   Negative: [1] Chest pain lasts > 5 minutes AND [2] occurred > 3 days ago (72 hours) AND [3] NO chest pain or cardiac symptoms now    Protocols used: Chest Pain-A-AH    "

## 2025-04-30 NOTE — TELEPHONE ENCOUNTER
Returned call to patient she states she is no longer having chest pain at the moment. Advised patient to report to nearest ER if symptoms return or worse for chest, thereafter she may follow up at scheduled appt 5/19/25. She voiced understanding

## 2025-05-06 ENCOUNTER — TELEPHONE (OUTPATIENT)
Dept: OBSTETRICS AND GYNECOLOGY | Facility: CLINIC | Age: 25
End: 2025-05-06
Payer: COMMERCIAL

## 2025-05-06 ENCOUNTER — HOSPITAL ENCOUNTER (EMERGENCY)
Facility: HOSPITAL | Age: 25
Discharge: HOME | End: 2025-05-06
Attending: EMERGENCY MEDICINE
Payer: COMMERCIAL

## 2025-05-06 VITALS
RESPIRATION RATE: 16 BRPM | BODY MASS INDEX: 21.16 KG/M2 | SYSTOLIC BLOOD PRESSURE: 120 MMHG | HEIGHT: 62 IN | HEART RATE: 72 BPM | DIASTOLIC BLOOD PRESSURE: 71 MMHG | TEMPERATURE: 99 F | WEIGHT: 115 LBS | OXYGEN SATURATION: 98 %

## 2025-05-06 DIAGNOSIS — O03.9 MISCARRIAGE: Primary | ICD-10-CM

## 2025-05-06 LAB
ABSOLUTE EOSINOPHIL (OHS): 0.04 K/UL
ABSOLUTE MONOCYTE (OHS): 0.62 K/UL (ref 0.3–1)
ABSOLUTE NEUTROPHIL COUNT (OHS): 5.55 K/UL (ref 1.8–7.7)
ALBUMIN SERPL BCP-MCNC: 4.1 G/DL (ref 3.5–5.2)
ALP SERPL-CCNC: 55 UNIT/L (ref 40–150)
ALT SERPL W/O P-5'-P-CCNC: 7 UNIT/L (ref 10–44)
ANION GAP (OHS): 7 MMOL/L (ref 8–16)
AST SERPL-CCNC: 25 UNIT/L (ref 11–45)
B-HCG UR QL: POSITIVE
BACTERIA #/AREA URNS AUTO: NORMAL /HPF
BASOPHILS # BLD AUTO: 0.04 K/UL
BASOPHILS NFR BLD AUTO: 0.5 %
BILIRUB SERPL-MCNC: 0.5 MG/DL (ref 0.1–1)
BILIRUB UR QL STRIP.AUTO: NEGATIVE
BUN SERPL-MCNC: 11 MG/DL (ref 6–20)
CALCIUM SERPL-MCNC: 8.8 MG/DL (ref 8.7–10.5)
CHLORIDE SERPL-SCNC: 107 MMOL/L (ref 95–110)
CLARITY UR: CLEAR
CO2 SERPL-SCNC: 25 MMOL/L (ref 23–29)
COLOR UR AUTO: YELLOW
CREAT SERPL-MCNC: 0.8 MG/DL (ref 0.5–1.4)
ERYTHROCYTE [DISTWIDTH] IN BLOOD BY AUTOMATED COUNT: 11.9 % (ref 11.5–14.5)
GFR SERPLBLD CREATININE-BSD FMLA CKD-EPI: >60 ML/MIN/1.73/M2
GLUCOSE SERPL-MCNC: 79 MG/DL (ref 70–110)
GLUCOSE UR QL STRIP: NEGATIVE
HCG INTACT+B SERPL-ACNC: 21.62 MIU/ML
HCT VFR BLD AUTO: 36.3 % (ref 37–48.5)
HCV AB SERPL QL IA: NEGATIVE
HGB BLD-MCNC: 12.1 GM/DL (ref 12–16)
HGB UR QL STRIP: NEGATIVE
HIV 1+2 AB+HIV1 P24 AG SERPL QL IA: NEGATIVE
HOLD SPECIMEN: NORMAL
HYALINE CASTS UR QL AUTO: 0 /LPF (ref 0–1)
IMM GRANULOCYTES # BLD AUTO: 0.03 K/UL (ref 0–0.04)
IMM GRANULOCYTES NFR BLD AUTO: 0.4 % (ref 0–0.5)
KETONES UR QL STRIP: NEGATIVE
LEUKOCYTE ESTERASE UR QL STRIP: ABNORMAL
LYMPHOCYTES # BLD AUTO: 1.86 K/UL (ref 1–4.8)
MCH RBC QN AUTO: 31.4 PG (ref 27–31)
MCHC RBC AUTO-ENTMCNC: 33.3 G/DL (ref 32–36)
MCV RBC AUTO: 94 FL (ref 82–98)
MICROSCOPIC COMMENT: NORMAL
NITRITE UR QL STRIP: NEGATIVE
NUCLEATED RBC (/100WBC) (OHS): 0 /100 WBC
PH UR STRIP: 7 [PH]
PLATELET # BLD AUTO: 212 K/UL (ref 150–450)
PMV BLD AUTO: 10.2 FL (ref 9.2–12.9)
POTASSIUM SERPL-SCNC: 3.5 MMOL/L (ref 3.5–5.1)
PROT SERPL-MCNC: 7.9 GM/DL (ref 6–8.4)
PROT UR QL STRIP: ABNORMAL
RBC # BLD AUTO: 3.85 M/UL (ref 4–5.4)
RBC #/AREA URNS AUTO: 1 /HPF (ref 0–4)
RELATIVE EOSINOPHIL (OHS): 0.5 %
RELATIVE LYMPHOCYTE (OHS): 22.9 % (ref 18–48)
RELATIVE MONOCYTE (OHS): 7.6 % (ref 4–15)
RELATIVE NEUTROPHIL (OHS): 68.1 % (ref 38–73)
SODIUM SERPL-SCNC: 139 MMOL/L (ref 136–145)
SP GR UR STRIP: >=1.03
SQUAMOUS #/AREA URNS AUTO: 11 /HPF
UROBILINOGEN UR STRIP-ACNC: ABNORMAL EU/DL
WBC # BLD AUTO: 8.14 K/UL (ref 3.9–12.7)
WBC #/AREA URNS AUTO: 5 /HPF (ref 0–5)

## 2025-05-06 PROCEDURE — 81003 URINALYSIS AUTO W/O SCOPE: CPT | Performed by: PHYSICIAN ASSISTANT

## 2025-05-06 PROCEDURE — 99283 EMERGENCY DEPT VISIT LOW MDM: CPT

## 2025-05-06 PROCEDURE — 81025 URINE PREGNANCY TEST: CPT | Performed by: PHYSICIAN ASSISTANT

## 2025-05-06 PROCEDURE — 82040 ASSAY OF SERUM ALBUMIN: CPT | Performed by: PHYSICIAN ASSISTANT

## 2025-05-06 PROCEDURE — 85025 COMPLETE CBC W/AUTO DIFF WBC: CPT | Performed by: PHYSICIAN ASSISTANT

## 2025-05-06 PROCEDURE — 86803 HEPATITIS C AB TEST: CPT | Performed by: PHYSICIAN ASSISTANT

## 2025-05-06 PROCEDURE — 84702 CHORIONIC GONADOTROPIN TEST: CPT | Performed by: PHYSICIAN ASSISTANT

## 2025-05-06 PROCEDURE — 87389 HIV-1 AG W/HIV-1&-2 AB AG IA: CPT | Performed by: PHYSICIAN ASSISTANT

## 2025-05-07 ENCOUNTER — OFFICE VISIT (OUTPATIENT)
Dept: OBSTETRICS AND GYNECOLOGY | Facility: CLINIC | Age: 25
End: 2025-05-07
Payer: COMMERCIAL

## 2025-05-07 VITALS
WEIGHT: 118.38 LBS | BODY MASS INDEX: 21.79 KG/M2 | HEIGHT: 62 IN | DIASTOLIC BLOOD PRESSURE: 70 MMHG | SYSTOLIC BLOOD PRESSURE: 112 MMHG

## 2025-05-07 DIAGNOSIS — O03.9 MISCARRIAGE: ICD-10-CM

## 2025-05-07 PROCEDURE — 99999 PR PBB SHADOW E&M-EST. PATIENT-LVL III: CPT | Mod: PBBFAC,,, | Performed by: NURSE PRACTITIONER

## 2025-05-07 PROCEDURE — 3078F DIAST BP <80 MM HG: CPT | Mod: CPTII,S$GLB,, | Performed by: NURSE PRACTITIONER

## 2025-05-07 PROCEDURE — 1159F MED LIST DOCD IN RCRD: CPT | Mod: CPTII,S$GLB,, | Performed by: NURSE PRACTITIONER

## 2025-05-07 PROCEDURE — 3008F BODY MASS INDEX DOCD: CPT | Mod: CPTII,S$GLB,, | Performed by: NURSE PRACTITIONER

## 2025-05-07 PROCEDURE — 1160F RVW MEDS BY RX/DR IN RCRD: CPT | Mod: CPTII,S$GLB,, | Performed by: NURSE PRACTITIONER

## 2025-05-07 PROCEDURE — 3074F SYST BP LT 130 MM HG: CPT | Mod: CPTII,S$GLB,, | Performed by: NURSE PRACTITIONER

## 2025-05-07 PROCEDURE — 99213 OFFICE O/P EST LOW 20 MIN: CPT | Mod: S$GLB,,, | Performed by: NURSE PRACTITIONER

## 2025-05-07 NOTE — ED PROVIDER NOTES
History      Chief Complaint   Patient presents with    Vaginal Bleeding     Pt states she is 10 weeks pregnant (unsure) and has been having intermittent vaginal bleeding since march. Denies current pain.        Review of patient's allergies indicates:  No Known Allergies     HPI   HPI    5/7/2025, 8:25 AM   History obtained from the patient      History of Present Illness: Rick Sierra is a 24 y.o. female patient who presents to the Emergency Department for vaginal bleeding, [facility] [place] [facility] on 3/31.   She is using 2 pads per day. Denies pain. Rh+ per epic.  No further complaints or concerns at this time.           PCP: Tino Shi, JUAREZ       Past Medical History:  Past Medical History:   Diagnosis Date    ACL tear     Childhood asthma     Obstetrical laceration, first degree 6/26/2021         Past Surgical History:  Past Surgical History:   Procedure Laterality Date    ARTHROSCOPY OF KNEE Left 6/2/2020    Procedure: ARTHROSCOPY, KNEE;  Surgeon: Jerson Torres MD;  Location: [facility];  Service: Orthopedics;  Laterality: Left;    RECONSTRUCTION OF ANTERIOR CRUCIATE LIGAMENT USING GRAFT Left 6/2/2020    Procedure: RECONSTRUCTION, KNEE, ACL, USING GRAFT;  Surgeon: Jerson Torres MD;  Location: [facility] OR;  Service: Orthopedics;  Laterality: Left;  with quad tendon autograft    REPAIR OF MENISCUS OF KNEE Left 6/2/2020    Procedure: REPAIR, MENISCUS, KNEE;  Surgeon: Jerson Torres MD;  Location: [facility];  Service: Orthopedics;  Laterality: Left;  medial meniscus repair performed     SURGICAL REMOVAL OF PILONIDAL CYST  03/27/2018           Family History:  Family History   Problem Relation Name Age of Onset    Hypertension Mother      No Known Problems Father      Asthma Sister      Asthma Brother      Breast cancer Neg Hx      Ovarian cancer Neg Hx      Colon cancer Neg Hx             Social History:  Social History     Tobacco Use    Smoking status: Never    Smokeless tobacco: Never  "  Substance and Sexual Activity    Alcohol use: Not Currently     Comment: occasionally    Drug use: Not Currently     Types: Marijuana     Comment: boyfriend does smoke    Sexual activity: Yes     Partners: Male       ROS     Review of Systems   Constitutional:  Negative for fever.   Genitourinary:  Negative for pelvic pain.       Physical Exam      Initial Vitals [05/06/25 1350]   BP Pulse Resp Temp SpO2   120/71 72 16 98.8 °F (37.1 °C) 98 %      MAP       --         Physical Exam  Vital signs and nursing notes reviewed.  Constitutional: Patient is in NAD. Awake and alert. Well-developed and well-nourished.  Head: Atraumatic. Normocephalic.  Eyes:  EOM intact. Conjunctivae nl. No scleral icterus.  ENT: Mucous membranes are moist.   Neck: Supple.  No meningismus  Cardiovascular: Regular rate and rhythm. No murmurs, rubs, or gallops.   Pulmonary/Chest: No respiratory distress. Clear to auscultation bilaterally. No wheezing, rales, or rhonchi.  Abdominal: Soft. Non-distended. No TTP. No rebound, guarding, or rigidity. Good bowel sounds.  Genitourinary: No CVA tenderness. No pelvic tenderness  Musculoskeletal: Moves all extremities. No edema.   Skin: Warm and dry.  Neurological: Awake and alert. No acute focal neurological deficits are appreciated.  Psychiatric: Normal affect. Good eye contact. Appropriate in content.      ED Course          Procedures  ED Vital Signs:  Vitals:    05/06/25 1350   BP: 120/71   Pulse: 72   Resp: 16   Temp: 98.8 °F (37.1 °C)   SpO2: 98%   Weight: 52.2 kg (115 lb)   Height: 5' 2" (1.575 m)         Results for orders placed or performed during the hospital encounter of 05/06/25   Urinalysis, Reflex to Urine Culture Urine, Clean Catch    Collection Time: 05/06/25  2:02 PM    Specimen: Urine, Clean Catch   Result Value Ref Range    Color, UA Yellow Straw, Quyen, Yellow, Light-Orange    Appearance, UA Clear Clear    pH, UA 7.0 5.0 - 8.0    Spec Grav UA >=1.030 (A) 1.005 - 1.030    Protein, UA " 1+ (A) Negative    Glucose, UA Negative Negative    Ketones, UA Negative Negative    Bilirubin, UA Negative Negative    Blood, UA Negative Negative    Nitrites, UA Negative Negative    Urobilinogen, UA 2.0-3.0 (A) <2.0 EU/dL    Leukocyte Esterase, UA Trace (A) Negative   Pregnancy, urine rapid    Collection Time: 05/06/25  2:02 PM   Result Value Ref Range    hCG Qualitative, Urine Positive (A) Negative   GREY TOP URINE HOLD    Collection Time: 05/06/25  2:02 PM   Result Value Ref Range    Extra Tube Hold for add-ons.    Urinalysis Microscopic    Collection Time: 05/06/25  2:02 PM   Result Value Ref Range    RBC, UA 1 0 - 4 /HPF    WBC, UA 5 0 - 5 /HPF    Bacteria, UA None None, Rare, Occasional /HPF    Squamous Epithelial Cells, UA 11 /HPF    Hyaline Casts, UA 0 0 - 1 /LPF    Microscopic Comment     Comprehensive metabolic panel    Collection Time: 05/06/25  2:08 PM   Result Value Ref Range    Sodium 139 136 - 145 mmol/L    Potassium 3.5 3.5 - 5.1 mmol/L    Chloride 107 95 - 110 mmol/L    CO2 25 23 - 29 mmol/L    Glucose 79 70 - 110 mg/dL    BUN 11 6 - 20 mg/dL    Creatinine 0.8 0.5 - 1.4 mg/dL    Calcium 8.8 8.7 - 10.5 mg/dL    Protein Total 7.9 6.0 - 8.4 gm/dL    Albumin 4.1 3.5 - 5.2 g/dL    Bilirubin Total 0.5 0.1 - 1.0 mg/dL    ALP 55 40 - 150 unit/L    AST 25 11 - 45 unit/L    ALT 7 (L) 10 - 44 unit/L    Anion Gap 7 (L) 8 - 16 mmol/L    eGFR >60 >60 mL/min/1.73/m2   hCG, quantitative, pregnancy    Collection Time: 05/06/25  2:08 PM   Result Value Ref Range    Beta HCG Quant 21.62 See Text mIU/mL   CBC with Differential    Collection Time: 05/06/25  2:08 PM   Result Value Ref Range    WBC 8.14 3.90 - 12.70 K/uL    RBC 3.85 (L) 4.00 - 5.40 M/uL    HGB 12.1 12.0 - 16.0 gm/dL    HCT 36.3 (L) 37.0 - 48.5 %    MCV 94 82 - 98 fL    MCH 31.4 (H) 27.0 - 31.0 pg    MCHC 33.3 32.0 - 36.0 g/dL    RDW 11.9 11.5 - 14.5 %    Platelet Count 212 150 - 450 K/uL    MPV 10.2 9.2 - 12.9 fL    Nucleated RBC 0 <=0 /100 WBC    Neut %  68.1 38 - 73 %    Lymph % 22.9 18 - 48 %    Mono % 7.6 4 - 15 %    Eos % 0.5 <=8 %    Basophil % 0.5 <=1.9 %    Imm Grans % 0.4 0.0 - 0.5 %    Neut # 5.55 1.8 - 7.7 K/uL    Lymph # 1.86 1 - 4.8 K/uL    Mono # 0.62 0.3 - 1 K/uL    Eos # 0.04 <=0.5 K/uL    Baso # 0.04 <=0.2 K/uL    Imm Grans # 0.03 0.00 - 0.04 K/uL   Hepatitis C Antibody    Collection Time: 05/06/25  2:08 PM   Result Value Ref Range    Hep C Ab Interp Negative Negative   HIV 1/2 Ag/Ab (4th Gen)    Collection Time: 05/06/25  2:08 PM   Result Value Ref Range    HIV 1/2 Ag/Ab Negative Negative             Imaging Results:  Imaging Results    None            The Emergency Provider reviewed the vital signs and test results, which are outlined above.    ED Discussion             Medication(s) given in the ER:  Medications - No data to display         Follow-up Information       [facility] In 2 days.    Specialty: Obstetrics and Gynecology  Contact information:  [address] [address] [phone]                                  Medication List      You have not been prescribed any medications.             Medical Decision Making        All findings were reviewed with the patient/family in detail.   All remaining questions and concerns were addressed at that time.  Patient/family has been counseled regarding the need for follow-up as well as the indication to return to the emergency room should new or worrisome developments occur.        MDM     Amount and/or Complexity of Data Reviewed  Clinical lab tests: ordered and reviewed  Tests in the radiology section of CPT®: ordered and reviewed                   Clinical Impression:        ICD-10-CM ICD-9-CM   1. Miscarriage  O03.9 634.90               Sallie Myers, SARAHI  05/07/25 0851

## 2025-05-07 NOTE — LETTER
May 7, 2025      [facility]  [address] [place] [address] [address] RADHA HICKEY [address]  Phone: [phone]  Fax: [phone]       Patient: Rick Sierra   YOB: 2000  Date of Visit: 05/07/2025    To Whom It May Concern:    Brisa Sierra  was at [facility] on 05/07/2025. The patient may return to work on 05/07/2025 with no restrictions. If you have any questions or concerns, or if I can be of further assistance, please do not hesitate to contact me.    Sincerely,    Tita Amos NP

## 2025-05-07 NOTE — PROGRESS NOTES
"Rick Sierra is a 24 y.o. female  presents with complaint of miscarriage.    Pt was seen at [facility] ER on 3/31/25 for bleeding in pregnancy with BHCG of 1859.8  Blood type O+  Was not seen by gyn and has had bleeding off an on since that day  Went to ER at [facility] yesterday and bhcg is down to 21.62    Pt is not preventing pregnancy     Patient's last menstrual period was 2025.    Past Medical History:   Diagnosis Date    ACL tear     Childhood asthma     Obstetrical laceration, first degree 2021     Past Surgical History:   Procedure Laterality Date    ARTHROSCOPY OF KNEE Left 2020    Procedure: ARTHROSCOPY, KNEE;  Surgeon: Jerson Torres MD;  Location: [facility] OR;  Service: Orthopedics;  Laterality: Left;    RECONSTRUCTION OF ANTERIOR CRUCIATE LIGAMENT USING GRAFT Left 2020    Procedure: RECONSTRUCTION, KNEE, ACL, USING GRAFT;  Surgeon: Jerson Torres MD;  Location: [facility] OR;  Service: Orthopedics;  Laterality: Left;  with quad tendon autograft    REPAIR OF MENISCUS OF KNEE Left 2020    Procedure: REPAIR, MENISCUS, KNEE;  Surgeon: Jerson Torres MD;  Location: [facility] OR;  Service: Orthopedics;  Laterality: Left;  medial meniscus repair performed     SURGICAL REMOVAL OF PILONIDAL CYST  2018     Social History[1]  Family History   Problem Relation Name Age of Onset    Hypertension Mother      No Known Problems Father      Asthma Sister      Asthma Brother      Breast cancer Neg Hx      Ovarian cancer Neg Hx      Colon cancer Neg Hx       OB History    Para Term  AB Living   2 1 1   1   SAB IAB Ectopic Multiple Live Births      0 1      # Outcome Date GA Lbr Shaun/2nd Weight Sex Type Anes PTL Lv   2 Current            1 Term 22 39w5d  3.04 kg (6 lb 11.2 oz) F Vag-Spont Local N CARRIE       /70 (BP Location: Left arm, Patient Position: Sitting)   Ht 5' 2" (1.575 m)   Wt 53.7 kg (118 lb 6.2 oz)   LMP 2025   BMI 21.65 kg/m² "     ROS:  Per hpi    PHYSICAL EXAM:  examination not indicated  Physical Exam     ASSESSMENT and PLAN:  1. Miscarriage  Ambulatory referral/consult to Obstetrics / Gynecology    HCG, Quantitative          Diagnoses and all orders for this visit:    Miscarriage  -     Ambulatory referral/consult to Obstetrics / Gynecology  -     HCG, Quantitative; Standing         Discussed miscarriage  Will check hcg tomorrow and pending levels will plan to follow to less than 1.2  Recommend pelvic rest until bleeding resolves and levels less than 1.2  Start prenatal as pt is not preventing pregnancy          [1]   Social History  Tobacco Use    Smoking status: Never    Smokeless tobacco: Never   Substance Use Topics    Alcohol use: Not Currently     Comment: occasionally    Drug use: Not Currently     Types: Marijuana     Comment: boyfriend does smoke

## 2025-05-07 NOTE — LETTER
May 7, 2025      [facility]  [address] [place] [address] [address] RADHA HICKEY [address]  Phone: [phone]  Fax: [phone]       Patient: Rick Sierra   YOB: 2000  Date of Visit: 05/07/2025    To Whom It May Concern:    Brisa Sierra  was at [facility] on 05/07/2025. The patient may return to work on 05/08/2025 with no restrictions. If you have any questions or concerns, or if I can be of further assistance, please do not hesitate to contact me.    Sincerely,    Tita Amos NP

## 2025-05-08 ENCOUNTER — LAB VISIT (OUTPATIENT)
Dept: LAB | Facility: HOSPITAL | Age: 25
End: 2025-05-08
Attending: NURSE PRACTITIONER
Payer: COMMERCIAL

## 2025-05-08 ENCOUNTER — RESULTS FOLLOW-UP (OUTPATIENT)
Dept: OBSTETRICS AND GYNECOLOGY | Facility: CLINIC | Age: 25
End: 2025-05-08

## 2025-05-08 DIAGNOSIS — O03.9 MISCARRIAGE: ICD-10-CM

## 2025-05-08 LAB — HCG INTACT+B SERPL-ACNC: 18.44 MIU/ML

## 2025-05-08 PROCEDURE — 84702 CHORIONIC GONADOTROPIN TEST: CPT

## 2025-05-08 PROCEDURE — 36415 COLL VENOUS BLD VENIPUNCTURE: CPT

## 2025-05-08 NOTE — PROGRESS NOTES
Called pt with results, 2 pt identifiers noted   Pt verbalized understanding       Awilda WALLIS LPN  OB/GYN

## 2025-05-15 ENCOUNTER — TELEPHONE (OUTPATIENT)
Dept: OBSTETRICS AND GYNECOLOGY | Facility: CLINIC | Age: 25
End: 2025-05-15
Payer: COMMERCIAL

## 2025-05-15 ENCOUNTER — LAB VISIT (OUTPATIENT)
Dept: LAB | Facility: HOSPITAL | Age: 25
End: 2025-05-15
Attending: NURSE PRACTITIONER
Payer: COMMERCIAL

## 2025-05-15 ENCOUNTER — RESULTS FOLLOW-UP (OUTPATIENT)
Dept: OBSTETRICS AND GYNECOLOGY | Facility: CLINIC | Age: 25
End: 2025-05-15

## 2025-05-15 DIAGNOSIS — O03.9 MISCARRIAGE: ICD-10-CM

## 2025-05-15 LAB — HCG INTACT+B SERPL-ACNC: 10.52 MIU/ML

## 2025-05-15 PROCEDURE — 84702 CHORIONIC GONADOTROPIN TEST: CPT

## 2025-05-15 PROCEDURE — 36415 COLL VENOUS BLD VENIPUNCTURE: CPT

## 2025-05-15 NOTE — TELEPHONE ENCOUNTER
----- Message from Tita Amos NP sent at 5/15/2025 10:25 AM CDT -----  Needs repeat bhcg in 2 weeks - level is dropping very slowly but is dropping  Orders should be in for standing bhcg - please call her to schedule   ----- Message -----  From: Lab, Background User  Sent: 5/15/2025   9:46 AM CDT  To: Tita Amos NP

## 2025-05-19 ENCOUNTER — OFFICE VISIT (OUTPATIENT)
Dept: PRIMARY CARE CLINIC | Facility: CLINIC | Age: 25
End: 2025-05-19
Payer: COMMERCIAL

## 2025-05-19 DIAGNOSIS — M79.604 PAIN IN BOTH LOWER EXTREMITIES: ICD-10-CM

## 2025-05-19 DIAGNOSIS — O03.9 MISCARRIAGE: Primary | ICD-10-CM

## 2025-05-19 DIAGNOSIS — M79.605 PAIN IN BOTH LOWER EXTREMITIES: ICD-10-CM

## 2025-05-19 DIAGNOSIS — M54.9 BACK PAIN, UNSPECIFIED BACK LOCATION, UNSPECIFIED BACK PAIN LATERALITY, UNSPECIFIED CHRONICITY: ICD-10-CM

## 2025-05-19 PROCEDURE — 1159F MED LIST DOCD IN RCRD: CPT | Mod: CPTII,95,, | Performed by: NURSE PRACTITIONER

## 2025-05-19 PROCEDURE — 98004 SYNCH AUDIO-VIDEO EST SF 10: CPT | Mod: 95,,, | Performed by: NURSE PRACTITIONER

## 2025-05-19 PROCEDURE — 1160F RVW MEDS BY RX/DR IN RCRD: CPT | Mod: CPTII,95,, | Performed by: NURSE PRACTITIONER
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
## (undated) DEVICE — PUSHER CUTTER KNOT FAST FX STR

## (undated) DEVICE — SUPPORT ULNA NERVE PROTECTOR

## (undated) DEVICE — DRAPE STERI INSTRUMENT 1018

## (undated) DEVICE — POSITIONER HEAD DONUT 9IN FOAM

## (undated) DEVICE — SUT 2 20 FIBERLOOP STR NDL

## (undated) DEVICE — MAT SURGICAL ECOSUCTIONER

## (undated) DEVICE — DRAPE MINI C-ARM

## (undated) DEVICE — Device

## (undated) DEVICE — SEE MEDLINE ITEM 157216

## (undated) DEVICE — REAMER LOW PROFILE 9 MM

## (undated) DEVICE — SYR ONLY LUER LOCK 20CC

## (undated) DEVICE — COVER LIGHT HANDLE 80/CA

## (undated) DEVICE — BANDAGE ACE DOUBLE STER 6IN

## (undated) DEVICE — PASSER SUTURE SCORPION 3.2MM

## (undated) DEVICE — BRACE KNEE T SCOPE PREMIER

## (undated) DEVICE — FLIPCUTTER III DRILL

## (undated) DEVICE — SEE ITEM #86277

## (undated) DEVICE — SEE MEDLINE ITEM 152523

## (undated) DEVICE — UNDERGLOVES BIOGEL PI SZ 7 LF

## (undated) DEVICE — TUBING PUMP ARTHROSCOPY STRL

## (undated) DEVICE — SOL IRR NACL .9% 3000ML

## (undated) DEVICE — GLOVE SURG ULTRA TOUCH 7.5

## (undated) DEVICE — SEE MEDLINE ITEM 146298

## (undated) DEVICE — SUT MONOCRYL 4.0 PS2 CP496G

## (undated) DEVICE — SEE MEDLINE ITEM 152622

## (undated) DEVICE — UNDERGLOVES BIOGEL PI SZ 6 LF

## (undated) DEVICE — SUT VICRYL PLUS 2-0 CT1 18

## (undated) DEVICE — UNDERGLOVE BIOGEL PI SZ 5.5

## (undated) DEVICE — SEE MEDLINE ITEM 157027

## (undated) DEVICE — NDL SAFETY 25G X 1.5 ECLIPSE

## (undated) DEVICE — PAD SHOULDER POLAR CARE XL

## (undated) DEVICE — APPLICATOR CHLORAPREP ORN 26ML

## (undated) DEVICE — GLOVE 8.5 PROTEXIS PI ORTHO

## (undated) DEVICE — SUT ETHILON 3/0 18IN PS-1

## (undated) DEVICE — GLOVE SURG ULTRA TOUCH 8.5

## (undated) DEVICE — SUT VICRYL PLUS 0 CT1 18IN

## (undated) DEVICE — PAD ABD 8X10 STERILE

## (undated) DEVICE — TUBING CROSSFLOW INFLOW

## (undated) DEVICE — COVER CAMERA OPERATING ROOM

## (undated) DEVICE — MANIFOLD 4 PORT

## (undated) DEVICE — PAD CAST SPECIALIST STRL 6

## (undated) DEVICE — DRAPE PLASTIC U 60X72

## (undated) DEVICE — DRESSING XEROFORM FOIL PK 1X8

## (undated) DEVICE — DRAPE STERI U-SHAPED 47X51IN

## (undated) DEVICE — GLOVE BIOGEL PI MICRO SZ 5.5

## (undated) DEVICE — GAUZE SPONGE 4X4 12PLY

## (undated) DEVICE — BLADE SHAVER TORPEDO 4MMX13CM

## (undated) DEVICE — SUT FIBERWIRE LOOP TIGER 2

## (undated) DEVICE — NDL HYPODERMIC BLUNT 18G 1.5IN

## (undated) DEVICE — NDL SAFETY 22G X 1.5 ECLIPSE

## (undated) DEVICE — SEE MEDLINE ITEM 157131

## (undated) DEVICE — SUT CTD VICRYL 0 UND BR SUT

## (undated) DEVICE — DISPOSABLES KIT, TRANSTIBIAL ACL WITH SAW BLADE

## (undated) DEVICE — SEE MEDLINE ITEM 157117

## (undated) DEVICE — GOWN SMARTGOWN LVL4 X-LONG XL

## (undated) DEVICE — SEE MEDLINE ITEM 146292

## (undated) DEVICE — CUBE COLD THERAPY POLAR CARE

## (undated) DEVICE — CLOSURE SKIN STERI STRIP 1/2X4

## (undated) DEVICE — UNDERGLOVES BIOGEL PI SIZE 8.5

## (undated) DEVICE — SYR 10CC LUER LOCK

## (undated) DEVICE — ADHESIVE MASTISOL VIAL 48/BX

## (undated) DEVICE — SUT FIBERWIRE FIBER 2M

## (undated) DEVICE — DRAPE INCISE IOBAN 2 23X17IN

## (undated) DEVICE — GLOVE BIOGEL ECLIPSE SZ 8.5

## (undated) DEVICE — TIP SUCTION YANKAUER

## (undated) DEVICE — BLADE SURG #15 CARBON STEEL

## (undated) DEVICE — SEE MEDLINE ITEM 152739

## (undated) DEVICE — TOURNIQUET SB QC DP 34X4IN

## (undated) DEVICE — SEE MEDLINE ITEM 146372

## (undated) DEVICE — NDL ECLIPSE SAFETY 18GX1-1/2IN

## (undated) DEVICE — ELECTRODE REM PLYHSV RETURN 9